# Patient Record
Sex: FEMALE | Race: OTHER | Employment: UNEMPLOYED | URBAN - METROPOLITAN AREA
[De-identification: names, ages, dates, MRNs, and addresses within clinical notes are randomized per-mention and may not be internally consistent; named-entity substitution may affect disease eponyms.]

---

## 2017-05-09 ENCOUNTER — OFFICE VISIT (OUTPATIENT)
Dept: FAMILY MEDICINE CLINIC | Age: 32
End: 2017-05-09

## 2017-05-09 ENCOUNTER — HOSPITAL ENCOUNTER (OUTPATIENT)
Dept: LAB | Age: 32
Discharge: HOME | End: 2017-05-09

## 2017-05-09 VITALS
HEART RATE: 67 BPM | TEMPERATURE: 97.7 F | SYSTOLIC BLOOD PRESSURE: 121 MMHG | DIASTOLIC BLOOD PRESSURE: 87 MMHG | HEIGHT: 65 IN | BODY MASS INDEX: 30.82 KG/M2 | WEIGHT: 185 LBS

## 2017-05-09 DIAGNOSIS — N92.1 MENORRHAGIA WITH IRREGULAR CYCLE: Primary | ICD-10-CM

## 2017-05-09 DIAGNOSIS — N92.1 MENORRHAGIA WITH IRREGULAR CYCLE: ICD-10-CM

## 2017-05-09 DIAGNOSIS — Z13.0 SCREENING FOR IRON DEFICIENCY ANEMIA: ICD-10-CM

## 2017-05-09 LAB
HCG SERPL QL: NEGATIVE
HGB BLD-MCNC: 11.8 G/DL

## 2017-05-09 PROCEDURE — 84703 CHORIONIC GONADOTROPIN ASSAY: CPT | Performed by: NURSE PRACTITIONER

## 2017-05-09 NOTE — PROGRESS NOTES
Assessment/Plan:       ICD-10-CM ICD-9-CM    1. Menorrhagia with irregular cycle N92.1 626.2 HCG QL SERUM   2. Screening for iron deficiency anemia Z13.0 V78.0 AMB POC HEMOGLOBIN (HGB)     Follow-up Disposition: Not on File    [facility]  Subjective:   Hamida Villarreal is a 34 y.o. OTHER female who speaks Ghanaian. Chief Complaint   Patient presents with    Vaginal Pain     with blood clots during intercourse and with period was told she has fibriod in the uterus x 1 month    History of Present Illness: Fibroid found one year ago. Had an ultrasound in [facility]. Her periods are very strong for 2 days with pain. She had IUD in [facility], had copper T and it was removed due to lower left abdominal pelvic pain. She had it for 8 years. Her periods have always been like this. She is having more pains with her periods. Started taking the BCP last month for 2 days and it made her sick. Has been having the period with a lot of clots and has been bleeding ever since. Her period then started again heavy today. Review of Systems: Negative for: fever, chest pain, shortness of breath, leg swelling, exertional dyspnea, palpitations. Past Surgical History: She  has no past surgical history on file. Social History: She  reports that she has never smoked. She does not have any smokeless tobacco history on file. She reports that she does not drink alcohol. Objective:     Vitals:    05/09/17 0837   BP: 121/87   Pulse: 67   Temp: 97.7 °F (36.5 °C)   TempSrc: Oral   Weight: 185 lb (83.9 kg)   Height: 5' 4.69\" (1.643 m)    Patient's last menstrual period was 05/09/2017. Wt Readings from Last 2 Encounters:   05/09/17 185 lb (83.9 kg)     Lab Review:  Results for orders placed or performed in visit on 05/09/17   AMB POC HEMOGLOBIN (HGB)   Result Value Ref Range    Hemoglobin (POC) 11.8       Physical Examination:   General appearance - well developed, no acute distress.    Chest - clear to auscultation. Heart - regular rate and rhythm without murmurs, rubs, or gallops. Abdomen - bowel sounds present x 4, NT, ND. Extremities - pulses intact. No peripheral edema. Assessment/Plan:   Hiro Burns was seen today for vaginal pain. Diagnoses and all orders for this visit:    Menorrhagia with irregular cycle  -     HCG QL SERUM; Future    Screening for iron deficiency anemia  -     AMB POC HEMOGLOBIN (HGB)    -pregnancy test  -April 19 last home test for neg pregnancy  Last pap was normal, more than 1 year ago. To follow up after bleeding has stopped for pelvic exam, eval Of pelvic mass/fibroid. If pregnancy test negative, will send in bcp. This plan was discussed with the patient. Follow-up Disposition: Not on File  Tati Woods, MSN, RN, FNP-BC, BC-ADM  Karis Alicea expressed understanding of this plan.

## 2017-05-09 NOTE — MR AVS SNAPSHOT
Visit Information Arianne Troncoso Personal Médico Departamento Teléfono del Dep. Número de visita 5/9/2017  8:30 AM Lencho PatiñoJANIS porras CVAN- [address] [phone] Upcoming Health Maintenance Date Due DTaP/Tdap/Td series (1 - Tdap) 3/23/2005 PAP AKA CERVICAL CYTOLOGY 3/23/2005 INFLUENZA AGE 9 TO ADULT 8/1/2017 Maia Ledesma A partir del:  5/9/2017 No Known Allergies Vacunas actuales [place] No hay ninguna vacuna archivada. No revisadas esta visita You Were Diagnosed With   
  
 Clayborne Ni Menorrhagia with irregular cycle    -  Primary ICD-10-CM: N92.1 ICD-9-CM: 626.2 Screening for iron deficiency anemia     ICD-10-CM: Z13.0 ICD-9-CM: V78.0 Partes vitales PS Pulso Temperatura [place] ( percentil de crecimiento) Peso (percentil de crecimiento) LMP (última edmundo) 121/87 (BP 1 Location: Left arm, BP Patient Position: Sitting) 67 97.7 °F (36.5 °C) (Oral) 5' 4.69\" (1.643 m) 185 lb (83.9 kg) 05/09/2017 BMI [facility]) Estado obstétrico Estatus de tabaquísmo 31.09 kg/m2 Having regular periods Never Smoker Historial de signos vitales BMI and BSA Data Body Mass Index Body Surface Area 31.09 kg/m 2 1.96 m 2 [facility] Name [facility] [address] [place] [address] [phone] Branch lista de medicamentos actualizada Aviso  As of 5/9/2017  9:46 AM  
 No se le ha recetado ningún medicamento. Hicimos lo siguiente AMB POC HEMOGLOBIN (HGB) [64610 CPT(R)] Introducing [facility] & HEALTH SERVICES! [facility] introduce portal paciente MyChart . Ahora se puede acceder a partes de branch expediente médico, enviar por correo electrónico la oficina de branch médico y solicitar renovaciones de medicamentos en línea. En branch navegador de Internet , Nikia Wagner a [email or web address] Narciso clic en el usuario por Ann Quill? Madi Maryana clic aquí en la sesión Berna Mass. Verá la página de registro [place]. Ingrese bacon código de Bank [employer] Justyna shay y alem aparece a continuación. Usted no tendrá que [employer] código después de wang completado el proceso de registro . Si usted no se inscribe antes de la fecha de caducidad , debe solicitar un nuevo código. · MyChart Código de acceso : [id number] Expires: 8/7/2017  9:44 AM 
 
Ingresa los últimos cuatro dígitos de bacon Número de Seguro Social ( xxxx ) y fecha de nacimiento ( dd / mm / aaaa ) alem se indica y narciso clic en Enviar. Usted será llevado a la siguiente página de registro . Crear un ID MyChart . Esta será bacon ID de inicio de sesión de MyChart y no puede ser [place] , por lo que pensar en andrea que es Ludmila Blanks y fácil de recordar . Crear andrea contraseña MyChart . Usted puede cambiar bacon contraseña en cualquier momento . Ingrese bacon Password Reset de preguntas y [place] . Ben [place] se puede utilizar en un momento posterior si usted olvida bacon contraseña. Introduzca bacon dirección de correo electrónico . Kirstin Garvin recibirá andrea notificación por correo electrónico cuando la nueva información está disponible en MyChart . Idella Kervin clic en Registrarse. Eric Laurie cande y descargar porciones de bacon expediente médico. 
Narciso clic en el enlace de descarga del menú Resumen para descargar andrea copia portátil de bacon información médica . Si tiene [employer] , por favor visite la sección de preguntas frecuentes del sitio web MyChart . Recuerde, MyChart NO es que se utilizará para las necesidades urgentes. Para emergencias médicas , llame al 911 . Ahora disponible en bacon iPhone y Android ! Por favor proporcione luis eduardo resumen de la documentación de cuidado a bacon próximo proveedor. If you have any questions after today's visit, please call [phone].

## 2017-05-09 NOTE — PROGRESS NOTES
Statements below were documented by Judy Fabian RN My  for this patient visit was Margaret. Patient discharged home with AVS. No further questions. Pharmacy of choice entered in computer per provider request . Sent to registrar to schedule pelvic exam at [facility] . Informed patient that if HCG serum is positive will receive a telephone call . Acknowledged understanding of information discussed at discharge.  Judy Fabian RN

## 2017-05-10 DIAGNOSIS — N92.1 MENORRHAGIA WITH IRREGULAR CYCLE: Primary | ICD-10-CM

## 2017-05-10 RX ORDER — NORGESTIMATE AND ETHINYL ESTRADIOL 0.25-0.035
1 KIT ORAL DAILY
Qty: 1 PACKAGE | Refills: 3 | Status: SHIPPED | OUTPATIENT
Start: 2017-05-10 | End: 2018-09-07

## 2017-05-10 NOTE — PROGRESS NOTES
No change to plan. Please let her know I sent in the birth control pills to regulate her period, she is not pregnant. She may start taking them right away.

## 2017-05-17 ENCOUNTER — TELEPHONE (OUTPATIENT)
Dept: FAMILY MEDICINE CLINIC | Age: 32
End: 2017-05-17

## 2017-05-17 NOTE — TELEPHONE ENCOUNTER
The pt was called using int. #[id number]. She was given the message her Hcg was negative. She was continue on th esame plan as discussed with her provider. The BCP rx was sent in to the Pharmacy. these were to regulate her periods. The Pharmacy was confirmed. She was told to start the pills right away. The pt verbalized understanding and denied further questions.  Bishop Darleen RN

## 2018-04-20 ENCOUNTER — OFFICE VISIT (OUTPATIENT)
Dept: FAMILY MEDICINE CLINIC | Age: 33
End: 2018-04-20

## 2018-04-20 VITALS
TEMPERATURE: 98.3 F | HEART RATE: 70 BPM | HEIGHT: 65 IN | WEIGHT: 197 LBS | BODY MASS INDEX: 32.82 KG/M2 | OXYGEN SATURATION: 100 % | SYSTOLIC BLOOD PRESSURE: 117 MMHG | DIASTOLIC BLOOD PRESSURE: 79 MMHG

## 2018-04-20 DIAGNOSIS — Z71.89 COUNSELING AND COORDINATION OF CARE: Primary | ICD-10-CM

## 2018-04-20 DIAGNOSIS — Z13.9 ENCOUNTER FOR SCREENING: Primary | ICD-10-CM

## 2018-04-20 DIAGNOSIS — J45.40 MODERATE PERSISTENT ASTHMA WITHOUT COMPLICATION: ICD-10-CM

## 2018-04-20 LAB
BILIRUB UR QL STRIP: NEGATIVE
GLUCOSE UR-MCNC: NEGATIVE MG/DL
KETONES P FAST UR STRIP-MCNC: NEGATIVE MG/DL
PH UR STRIP: 5 [PH] (ref 4.6–8)
PROT UR QL STRIP: NEGATIVE
SP GR UR STRIP: 1 (ref 1–1.03)
UA UROBILINOGEN AMB POC: NORMAL (ref 0.2–1)
URINALYSIS CLARITY POC: CLEAR
URINALYSIS COLOR POC: YELLOW
URINE BLOOD POC: NEGATIVE
URINE LEUKOCYTES POC: NORMAL
URINE NITRITES POC: NEGATIVE

## 2018-04-20 RX ORDER — MONTELUKAST SODIUM 10 MG/1
10 TABLET ORAL DAILY
Qty: 90 TAB | Refills: 1 | Status: SHIPPED | OUTPATIENT
Start: 2018-04-20

## 2018-04-20 RX ORDER — MONTELUKAST SODIUM 10 MG/1
10 TABLET ORAL DAILY
Qty: 90 TAB | Refills: 1 | Status: SHIPPED | OUTPATIENT
Start: 2018-04-20 | End: 2018-04-20 | Stop reason: SDUPTHER

## 2018-04-20 RX ORDER — LORATADINE 10 MG/1
10 TABLET ORAL DAILY
Qty: 90 TAB | Refills: 3 | Status: SHIPPED | OUTPATIENT
Start: 2018-04-20

## 2018-04-20 RX ORDER — ALBUTEROL SULFATE 90 UG/1
2 AEROSOL, METERED RESPIRATORY (INHALATION)
Qty: 1 INHALER | Refills: 11
Start: 2018-04-20

## 2018-04-20 NOTE — PROGRESS NOTES
Printed AVS, provided to pt and reviewed. Pt indicated understanding and had no questions. Told pt that rx's have been sent to pharmacy and they should be ready for  in approximately 2 hrs. Please present GoodRx. com coupon which we provide to your pharmacy to receive discounted price. The medication's were reviewed with the pt. Crossover application was given to the pt. The pt was told about resources needed for family planning and was going to give the pt the names and addresses but the pt informed the  Usman Koenig she is trying to get pregnant. Usman Koenig stated she  is going to have the pt call and get an appt at [address] [place] [address]. Pt [place] near there.  Yessica Nagel RN

## 2018-04-20 NOTE — PROGRESS NOTES
Results for orders placed or performed in visit on 04/20/18   AMB POC URINALYSIS DIP STICK MANUAL W/O MICRO   Result Value Ref Range    Color (UA POC) Yellow     Clarity (UA POC) Clear     Glucose (UA POC) Negative Negative    Bilirubin (UA POC) Negative Negative    Ketones (UA POC) Negative Negative    Specific gravity (UA POC) 1.005 1.001 - 1.035    Blood (UA POC) Negative Negative    pH (UA POC) 5.0 4.6 - 8.0    Protein (UA POC) Negative Negative    Urobilinogen (UA POC) 0.2 mg/dL 0.2 - 1    Nitrites (UA POC) Negative Negative    Leukocyte esterase (UA POC) 1+ Negative

## 2018-04-20 NOTE — MR AVS SNAPSHOT
Julio C Sutton 
 
 
 [facility] [address] [facility] 57 
[phone] Patient: Ronni Lawson MRN: [id number] FWA:[id number] Visit Information Clint Fernández Personal Médico Departamento Teléfono del [facility]. Número de visita 4/20/2018  9:00 AM [facility] [phone] Follow-up Instructions Return in about 4 weeks (around 5/18/2018). Your Appointments 5/21/2018  2:00 PM  
Follow Up with Yelena Kurtz NP  
[facility] Appt Note: Follow up (4 weeks) per MM by [facility] [address] [facility] 7 [phone]  
[phone]  
  
   
 [facility] [address] [place] [phone] Upcoming Health Maintenance Date Due DTaP/Tdap/Td series (1 - Tdap) 3/23/2005 PAP AKA CERVICAL CYTOLOGY 3/23/2005 Influenza Age 5 to Adult 8/1/2017 Alergias  Review Complete El: 5/9/2017 Por: JANIS Vieyra del:  4/20/2018 No Known Allergies Vacunas actuales [place] No hay ninguna vacuna archivada. No revisadas esta visita You Were Diagnosed With   
  
 Jase Clipper Encounter for screening    -  Primary ICD-10-CM: Z13.9 ICD-9-CM: V82.9 Moderate persistent asthma without complication     E-43-KJ: J45.40 ICD-9-CM: 493.90 Partes vitales PS Pulso Temperatura [place] ( percentil de crecimiento) Peso (percentil de crecimiento) LMP (última edmundo)  
 117/79 (BP 1 Location: Right arm) 70 98.3 °F (36.8 °C) (Oral) 5' 4.57\" (1.64 m) 197 lb (89.4 kg) 03/22/2018 SpO2 BMI (IM) Estado obstétrico Estatus de tabaquísmo 100% 33.22 kg/m2 Having regular periods Never Smoker Historial de signos vitales BMI and BSA Data Body Mass Index Body Surface Area  
 33.22 kg/m 2 2.02 m 2 [facility] Name Phone [facility] [address] [place] [address] [phone] Branch lista de medicamentos actualizada Ioana Sy actualizada 4/20/18 10:17 AM.  Sydell Simmonds use branch lista de medicamentos más reciente. albuterol 90 mcg/actuation inhaler También conocido alem:  PROVENTIL HFA, VENTOLIN HFA, PROAIR HFA Take 2 Puffs by inhalation every four (4) hours as needed for Wheezing. To send to [facility] once she is qualified  
  
 fluticasone-salmeterol 115-21 mcg/actuation inhaler También conocido alem:  ADVAIR HFA Take 2 Puffs by inhalation two (2) times a day. To send to [facility] once she is qualified  
  
 loratadine 10 mg tablet También conocido alem:  Wen Dumont Take 1 Tab by mouth daily. Maybell 1 pastilla cada sahra  
  
 montelukast 10 mg tablet También conocido alem:  SINGULAIR Take 1 Tab by mouth daily. May[place] 1 pastilla cada sahra  
  
 norgestimate-ethinyl estradiol 0.25-35 mg-mcg Tab También conocido alem:  Mark Norma Take 1 Tab by mouth daily. Eneida 1 cada sahra. Recetas Enviado a la Chata Refills  
 montelukast (SINGULAIR) 10 mg tablet 1 Sig: Take 1 Tab by mouth daily. Maybell 1 pastilla cada sahra Class: Normal  
 Pharmacy: [facility] DR AMY LARSEN [address] [place] [address] Ph #: [phone] Route: Oral  
 loratadine (CLARITIN) 10 mg tablet 3 Sig: Take 1 Tab by mouth daily. Maybell 1 pastilla cada sahra Class: Normal  
 Pharmacy: [facility] DR AMY LARSEN [address] [place] [address] Ph #: [phone] Route: Oral  
  
Hicimos lo siguiente AMB POC URINALYSIS DIP STICK MANUAL W/O MICRO [57039 CPT(R)] Instrucciones de seguimiento Return in about 4 weeks (around 5/18/2018). Instrucciones para el Paciente Plan de acción para el asma: Después de la consulta [facility] Chol Action Plan: After Your Visit] Instrucciones de cuidado Un plan de acción para el asma se basa en el flujo justus y en los síntomas del asma. Clasificar los síntomas y el flujo justus en \"zonas\" Claryce Rasp y verdes puede ayudar a saber qué fox grave es el asma y qué medidas debe deni. Colabore con bacon médico para elaborar bacon plan. Un plan de acción puede incluir: 
· Las lecturas de flujo justus y síntomas para cada jodie. · Qué medicamentos debe deni en cada jodie. · Cuándo llamar al [place]. 
· Cristina Celina lista de números telefónicos de emergencia. · Andrea lista de hannah factores desencadenantes del asma. La atención de seguimiento es andrea parte clave de bacon tratamiento y seguridad. Asegúrese de hacer y acudir a todas las citas, y llame a bacon médico si está teniendo problemas. También es andrea buena idea saber los resultados de los exámenes y mantener andrea lista de los medicamentos que ana. Cómo puede cuidarse en el hogar? · [place] hannah medicamentos diarios para minimizar los daños a Rosea Haus y evitar ataques de asma. · Verifique bacon flujo justus cada mañana y noche. Esta es la mejor manera de saber cómo están funcionando los pulmones. · Revise bacon plan de acción para cande en qué jodie está. ¨ Si está en la jodie leanna, siga tomando hannah medicamentos diarios para el asma según las indicaciones. ¨ Si está en la jodie amarilla, puede estar teniendo o tendrá en breve un ataque de asma. Es posible que no tenga ningún síntoma, kuldip hannah pulmones no están funcionando fox cricket alem deberían. [place] los medicamentos que figuran en bacon plan de acción. Si se mantiene en la jodie ANDREA, el médico podría necesitar aumentar la dosis o agregar otro medicamento. ¨ Si está en la jodie kyle, siga bacon plan de acción. Si hannah síntomas o bacon flujo justus no mejoran pronto, es posible que tenga que ir a la ronald de emergencias o que lo tengan que internar en el hospital. 
· Lleve un diario del asma. Anote hannah lecturas de flujo justus en el diario del asma. Si tiene un ataque, anote cuál fue la causa (si la sabe), los síntomas y qué medicamento tomó. · Asegúrese de saber cómo y cuándo llamar al médico o ir al hospital. 
· Lleve el plan de acción para el asma y el diario del asma, junto con bacon medidor de flujo justus y keren medicamentos cuando navarro a bacon médico. Dígale a bacon médico si está teniendo problemas para seguir bacon plan de acción. Cuándo debe pedir ayuda? Llame al 911 en cualquier momento que considere que necesita atención de emergencia. Por ejemplo, llame si: · Tiene graves dificultades para respirar. Llame a bacon médico ahora mismo o busque atención médica inmediata si: 
· Keren síntomas no mejoran después de seguir el plan de acción para el asma. · Tose con mucosidad (esputo) amarilla, color café oscuro o con shilpi. Preste especial atención a los cambios en bacon fabian y asegúrese de comunicarse con bacon médico si: 
· La tos y las sibilancias (respiración con silbidos) Marvetta Laity. · Necesita usar el medicamento de alivio rápido [place] de 2 días a la semana (a menos que sea solo para hacer ejercicio). · Necesita ayuda para averiguar qué desencadena los ataques de asma. Dónde puede encontrar más información en inglés? Meeta Taylor a DealExplorer.be Escriba B511 en la búsqueda para aprender más acerca de \"Plan de acción para el asma: Después de la consulta. \"  
© 8802-1080 Healthwise, Incorporated. Instrucciones de cuidado adaptadas bajo licencia por Deneice Chiles (which disclaims liability or warranty for this information). Estas instrucciones de cuidado son para usarlas con bacon profesional clínico registrado. Si tiene preguntas acerca de andrea afección médica o de estas instrucciones, pregunte siempre a bacon profesional de [employer]. Healthwise, Incorporated niega cualquier garantía o responsabilidad por bacon uso de esta información. Versión del contenido: [id number] Stains revisión: 9 marzo, 2012 Introducing \[facility]\"" & [facility] SERVICES! [facility] introduce portal paciente MyChart .  Ahora se puede acceder a partes de bacon expediente médico, enviar por correo electrónico la oficina de bacon médico y solicitar renovaciones de medicamentos en línea. En bacon navegador de Internet , James Cardenas a [email or web address] Narciso clic en el usuario por Restrepo Josie? Joonmayra Carter clic aquí en la sesión [facility] Rail. Verá la página de registro [place]. Ingrese bacon código de [employer] of Justyna shay y alem aparece a continuación. Usted no tendrá que [employer] código después de wang completado el proceso de registro . Si usted no se inscribe antes de la fecha de caducidad , debe solicitar un nuevo código. · MyChart Código de acceso : [id number] Expires: 7/19/2018 10:17 AM 
 
Ingresa los últimos cuatro dígitos de bacno Número de Seguro Social ( xxxx ) y fecha de nacimiento ( dd / mm / aaaa ) alem se indica y narciso clic en Enviar. Usted será llevado a la siguiente página de registro . Crear un ID MyChart . Esta será bacon ID de inicio de sesión de MyChart y no puede ser [place] , por lo que pensar en andrea que es Angely Hamming y fácil de recordar . Crear andrea contraseña MyChart . Usted puede cambiar bacon contraseña en cualquier momento . Ingrese bacon Password Reset de preguntas y [place] . [place] se puede utilizar en un momento posterior si usted olvida bacon contraseña. Introduzca bacon dirección de correo electrónico . Zack Taylor recibirá andrea notificación por correo electrónico cuando la nueva información está disponible en MyChart . Jihan Hernández clic en Registrarse. Melissa Pu cande y descargar porciones de bacon expediente médico. 
Narciso clic en el enlace de descarga del menú Resumen para descargar andrea copia portátil de bacon información médica . Si tiene [employer] , por favor visite la sección de preguntas frecuentes del sitio web MyChart . Recuerde, MyChart NO es que se utilizará para las necesidades urgentes. Para emergencias médicas , llame al 911 . Ahora disponible en bacon iPhone y Android ! Por favor proporcione luis eduardo resumen de la documentación de cuidado a bacon próximo proveedor. If you have any questions after today's visit, please call [phone].

## 2018-04-20 NOTE — PATIENT INSTRUCTIONS
Plan de acción para el asma: Después de la consulta  [Asthma Action Plan: After Your Visit]  Instrucciones de cuidado  Un plan de acción para el asma se basa en el flujo justus y en los síntomas del asma. Clasificar los síntomas y el flujo justus en \"zonas\" Royetta Dubin y verdes puede ayudar a saber qué fox grave es el asma y qué medidas debe deni. Colabore con bacon médico para elaborar bacon plan. Un plan de acción puede incluir:  · Las lecturas de flujo justus y síntomas para cada jodie. · Qué medicamentos debe deni en cada jodie. · Cuándo llamar al Rebbecca Jesse.  · Pamila Locker lista de números telefónicos de emergencia. · Andrea lista de hannah factores desencadenantes del asma. La atención de seguimiento es andrea parte clave de bacon tratamiento y seguridad. Asegúrese de hacer y acudir a todas las citas, y llame a bacon médico si está teniendo problemas. También es andrea buena idea saber los resultados de los exámenes y mantener andrea lista de los medicamentos que ana. ¿Cómo puede cuidarse en el hogar? · [place] hannah medicamentos diarios para minimizar los daños a Jinnie Nazia y evitar ataques de asma. · Verifique bacon flujo justus cada mañana y noche. Esta es la mejor manera de saber cómo están funcionando los pulmones. · Revise bacon plan de acción para cande en qué jodie está. ¨ Si está en la jodie leanna, siga tomando hannah medicamentos diarios para el asma según las indicaciones. ¨ Si está en la jodie amarilla, puede estar teniendo o tendrá en breve un ataque de asma. Es posible que no tenga ningún síntoma, kuldip hannah pulmones no están funcionando fox cricket alem deberían. [place] los medicamentos que figuran en bacon plan de acción. Si se mantiene en la jodie ANDREA, el médico podría necesitar aumentar la dosis o agregar otro medicamento. ¨ Si está en la jodie kyle, siga bacon plan de acción.  Si hannah síntomas o bacon flujo justus no mejoran pronto, es posible que tenga que ir a la ronald de emergencias o que lo tengan que internar en el hospital.  · [employer] un diario del asma. Anote keren lecturas de flujo justus en el diario del asma. Si tiene un ataque, anote cuál fue la causa (si la sabe), los síntomas y qué medicamento tomó. · Asegúrese de saber cómo y cuándo llamar al médico o ir al hospital.  · Lleve el plan de acción para el asma y el diario del asma, junto con bacon medidor de flujo justus y keren medicamentos cuando navarro a bacon médico. Dígale a bacon médico si está teniendo problemas para seguir bacon plan de acción. ¿Cuándo debe pedir ayuda? Llame al 911 en cualquier momento que considere que necesita atención de emergencia. Por ejemplo, llame si:  · Tiene graves dificultades para respirar. Llame a bacon médico ahora mismo o busque atención médica inmediata si:  · Keren síntomas no mejoran después de seguir el plan de acción para el asma. · Tose con mucosidad (esputo) amarilla, color café oscuro o con shilpi. Preste especial atención a los cambios en bacon fabian y asegúrese de comunicarse con bacon médico si:  · La tos y las sibilancias (respiración con silbidos) Jodene Meng. · Necesita usar el medicamento de alivio rápido [id number] Krys Heredia de 2 días a la semana (a menos que sea solo para hacer ejercicio). · Necesita ayuda para averiguar qué desencadena los ataques de asma. ¿Dónde puede encontrar más información en inglés? Carlito Late a DealExplorer.be  Frank Hatter B511 en la búsqueda para aprender más acerca de \"Plan de acción para el asma: Después de la consulta. \"   © 9545-8588 Healthwise, Incorporated. Instrucciones de cuidado adaptadas bajo licencia por Camilo Fajardo (which disclaims liability or warranty for this information). Estas instrucciones de cuidado son para usarlas con bacon profesional clínico registrado. Si tiene preguntas acerca de andrea afección médica o de estas instrucciones, pregunte siempre a bacon profesional de [employer]. Healthwise, Incorporated niega cualquier garantía o responsabilidad por bacon uso de esta información.   Versión del contenido: [id number]; Akua Lyubov revisión: [address] [place] [address], [facility]

## 2018-04-20 NOTE — PROGRESS NOTES
Assessment/Plan:    Diagnoses and all orders for this visit:    1. Encounter for screening  -     AMB POC URINALYSIS DIP STICK MANUAL W/O MICRO    2. Moderate persistent asthma without complication  -     montelukast (SINGULAIR) 10 mg tablet; Take 1 Tab by mouth daily. [place] 1 pastilla cada sahra  -     loratadine (CLARITIN) 10 mg tablet; Take 1 Tab by mouth daily. [place] 1 pastilla cada sahra  -     fluticasone-salmeterol (ADVAIR HFA) 115-21 mcg/actuation inhaler; Take 2 Puffs by inhalation two (2) times a day. [facility] once she is qualified  -     albuterol (PROVENTIL HFA, VENTOLIN HFA, PROAIR HFA) 90 mcg/actuation inhaler; Take 2 Puffs by inhalation every four (4) hours as needed for Wheezing. To [facility] to [facility] once she is qualified    Using prednisone 5 mg PRN to help with her asthma sxs, also on albuterol inhaler- meds from [address] [place] [address]. are present all year long  Has had them since childhood    Follow-up Disposition:  Return in about 4 weeks (around 5/18/2018). ANNEMARIE Lange expressed understanding of this plan. An AVS was printed and given to the patient.      ----------------------------------------------------------------------    Chief Complaint   Patient presents with    Follow-up     asthma    Urinary Frequency     x1 year. Patient states she is in pain in abdomen but not while urinating. History of Present Illness:  Pt here for asthma treatment. She has had asthma since childhood but since her move to the [address] [place] [address] in 2/17, she has been suffering the whole time. Her sxs are wheezing and shortness of breath. She gets nervous due to the sxs. She is fine in her house (?) but when she leaves the house, her sxs flare up. She takes prednisone 5 mg tab for \"crisis\", she took one pill 2 days ago and her breathing improved. Her only other med is her albuterol inhaler. She has one 10 year child. She is staying at home and not working due to her asthma.  She would like to be working. She is in a relationship. She mentioned that she is here on political asylum due to the horrible things that happened in [facility] but she did not elaborate further. She is a non smoker. She thinks that her nervousness is all due to the asthma, not due to the trauma    She denies dysuria. She has painful, heavy periods. I am asking for her to be sent to [facility]/local resource for this so that she may have family planning if she decides to do this       No past medical history on file. Current Outpatient Prescriptions   Medication Sig Dispense Refill    montelukast (SINGULAIR) 10 mg tablet Take 1 Tab by mouth daily. [place] 1 pastilla cada sahra 90 Tab 1    loratadine (CLARITIN) 10 mg tablet Take 1 Tab by mouth daily. [place] 1 pastilla cada sahra 90 Tab 3    fluticasone-salmeterol (ADVAIR HFA) 115-21 mcg/actuation inhaler Take 2 Puffs by inhalation two (2) times a day. To [facility] once she is qualified 1 Inhaler 11    albuterol (PROVENTIL HFA, VENTOLIN HFA, PROAIR HFA) 90 mcg/actuation inhaler Take 2 Puffs by inhalation every four (4) hours as needed for Wheezing. To [facility] to [facility] once she is qualified 1 Inhaler 11    norgestimate-ethinyl estradiol (ORTHO-CYCLEN, [facility]) 0.25-35 mg-mcg tab Take 1 Tab by mouth daily. [place] 1 cada sahra. 1 Package 3       No Known Allergies    Social History   Substance Use Topics    Smoking status: Never Smoker    Smokeless tobacco: Never Used    Alcohol use No       No family history on file. Physical Exam:     Visit Vitals    /79 (BP 1 Location: Right arm)    Pulse 70    Temp 98.3 °F (36.8 °C) (Oral)    Ht 5' 4.57\" (1.64 m)    Wt 197 lb (89.4 kg)    LMP 03/22/2018    SpO2 100%    BMI 33.22 kg/m2       A&Ox3  WDWN NAD  Respirations normal and non labored  Crying at times  She has gained 40 lbs since arriving in the [place].  Not active, staying in home  Lungs are cTA keith today

## 2018-04-24 NOTE — PROGRESS NOTES
Met with patient for [facility] referral. Support letter pending for financial screening.  Carlos Perez

## 2018-09-04 ENCOUNTER — OFFICE VISIT (OUTPATIENT)
Dept: OBGYN CLINIC | Age: 33
End: 2018-09-04

## 2018-09-04 DIAGNOSIS — Z34.81 NORMAL PREGNANCY IN MULTIGRAVIDA IN FIRST TRIMESTER: Primary | ICD-10-CM

## 2018-09-04 PROBLEM — Z34.90 PREGNANCY: Status: ACTIVE | Noted: 2018-09-04

## 2018-09-04 NOTE — PROGRESS NOTES
Current pregnancy history:    Siobhan Sheffield is a 28 y.o. female who presents for the evaluation of pregnancy. Patient's last menstrual period was in May. LMP history:  The date of her LMP is not certain. It was some time in May. This would make her 13 to 18 weeks. Ultrasound data:  She has an ultrasound scheduled for tomorrow. Pregnancy symptoms:    Since her LMP she has experienced  urinary frequency, breast tenderness, and nausea. She has been vomiting over the last few weeks. Associated signs and symptoms which she denies: dysuria, discharge, vaginal bleeding. She states she has lost weight:  Approximately 5 pounds over the last few weeks. Relevant past pregnancy history:   She has the following pregnancy history: Her last pregnancy was complicated by elevated blood pressure   She has no history of  delivery. Relevant past medical history:(relevant to this pregnancy): noncontributory. Pap/Occupational history:  Last pap smear: last year Results: Normal      Her occupation is: none. Substance history: negative for alcohol, tobacco and street drugs. Positive for nothing. Exposure history: There is/are no indoor cat/s in the home. She admits close contact with children on a regular basis. She has not had chicken pox or the vaccine in the past.   Patient denies issues with domestic violence. Genetic Screening/Teratology Counseling: (Includes patient, baby's father, or anyone in either family with:)  3.  Patient's age >/= 28 at [facility] 39?-- no  .   2. Thalassemia ([facility], [facility], [address], or  background): MCV<80?--no.     3.  Neural tube defect (meningomyelocele, spina bifida, anencephaly)?--no.   4.  Congenital heart defect?--no.  5.  Down syndrome?--no.   6.  Gómez-Sachs ([place], [place])?--no.   7.  Canavan's Disease?--no.   8.  Familial Dysautonomia?--no.   9.  Sickle cell disease or trait ()? --no   The patient has not been tested for sickle trait  10. Hemophilia or other blood disorders?--no. 11.  Muscular dystrophy?--no. 12.  Cystic fibrosis?--no. 13.  Ellis's Chorea?--no. 14.  Mental retardation/autism (if yes was person tested for Fragile X)?--no. 15.  Other inherited genetic or chromosomal disorder?--no. 12.  Maternal metabolic disorder (DM, PKU, etc)?--no. 17.  Patient or FOB with a child with a birth defect not listed above?--no.  17a. Patient or FOB with a birth defect themselves?--no. 18.  Recurrent pregnancy loss, or stillbirth?--no. 19.  Any medications since LMP other than prenatal vitamins (include vitamins,  supplements, OTC meds, drugs, alcohol)?--no. 20.  Any other genetic/environmental exposure to discuss?--no. Infection History:  1. Lives with someone with TB or TB exposed?--no.   2.  Patient or partner has history of genital herpes?--no.  3.  Rash or viral illness since LMP?--no.    4.  History of STD (GC, CT, HPV, syphilis, HIV)? --no   5. Other: OTHER? Past Medical History:   Diagnosis Date    Asthma     Hypertension      History reviewed. No pertinent surgical history. Social History     Occupational History    Not on file. Social History Main Topics    Smoking status: Never Smoker    Smokeless tobacco: Never Used    Alcohol use No    Drug use: No    Sexual activity: Yes     Family History   Problem Relation Age of Onset    No Known Problems Mother     No Known Problems Father        No Known Allergies  Prior to Admission medications    Medication Sig Start Date End Date Taking? Authorizing Provider   loratadine (CLARITIN) 10 mg tablet Take 1 Tab by mouth daily. [place] 1 dylan barr sahra 4/20/18   BARB Horner   fluticasone-salmeterol (ADVAIR HFA) 115-21 mcg/actuation inhaler Take 2 Puffs by inhalation two (2) times a day.  To [facility] once she is qualified 4/20/18   BARB Mercedes   albuterol (PROVENTIL HFA, VENTOLIN HFA, PROAIR HFA) 90 mcg/actuation inhaler Take 2 Puffs by inhalation every four (4) hours as needed for Wheezing. To send to [facility] once she is qualified 4/20/18   BARB Sarmiento   montelukast (SINGULAIR) 10 mg tablet Take 1 Tab by mouth daily. [place] 1 pastilla cada sahra 4/20/18   BARB Horner   norgestimate-ethinyl estradiol (ORTHO-CYCLEN, [facility]) 0.25-35 mg-mcg tab Take 1 Tab by mouth daily. [place] 1 cada sahra.  5/10/17   Kriss Couch NP        Review of Systems: History obtained from the patient  Constitutional: negative for weight loss, fever, night sweats  HEENT: negative for hearing loss, earache, congestion, snoring, sorethroat  CV: negative for chest pain, palpitations, edema  Resp: negative for cough, shortness of breath, wheezing  Breast: negative for breast lumps, nipple discharge, galactorrhea  GI: negative for change in bowel habits, abdominal pain, black or bloody stools  : negative for frequency, dysuria, hematuria, vaginal discharge  MSK: negative for back pain, joint pain, muscle pain  Skin: negative for itching, rash, hives  Neuro: negative for dizziness, headache, confusion, weakness  Psych: negative for anxiety, depression, change in mood  Heme/lymph: negative for bleeding, bruising, pallor    Objective:  Visit Vitals    LMP 03/22/2018       Physical Exam:   PHYSICAL EXAMINATION    Constitutional  · Appearance: well-nourished, well developed, alert, in no acute distress    HENT  · Head  · Face: appears normal  · Eyes: appear normal  · Ears: normal  · Mouth: normal  · Lips: no lesions    Neck  · Inspection/Palpation: normal appearance, no masses or tenderness  · Lymph Nodes: no lymphadenopathy present  · Thyroid: gland size normal, nontender, no nodules or masses present on palpation    Chest  · Respiratory Effort: breathing unlabored  · Auscultation: normal breath sounds    Cardiovascular  · Heart:  · Auscultation: regular rate and rhythm without murmur    Breasts  · Inspection of Breasts: breasts symmetrical, no skin changes, no discharge present, nipple appearance normal, no skin retraction present  · Palpation of Breasts and Axillae: no masses present on palpation, no breast tenderness  · Axillary Lymph Nodes: no lymphadenopathy present    Gastrointestinal  · Abdominal Examination: abdomen non-tender to palpation, normal bowel sounds, no masses present  · Liver and spleen: no hepatomegaly present, spleen not palpable  · Hernias: no hernias identified    Genitourinary  · External Genitalia: normal appearance for age, no discharge present, no tenderness present, no inflammatory lesions present, no masses present, no atrophy present  · Vagina: normal vaginal vault without central or paravaginal defects, no discharge present, no inflammatory lesions present, no masses present  · Bladder: non-tender to palpation  · Urethra: appears normal  · Cervix: normal   · Uterus: enlarged about 16 weeks, normal shape, soft  · Adnexa: no adnexal tenderness present, no adnexal masses present  · Perineum: perineum within normal limits, no evidence of trauma, no rashes or skin lesions present  · Anus: anus within normal limits, no hemorrhoids present  · Inguinal Lymph Nodes: no lymphadenopathy present    Skin  · General Inspection: no rash, no lesions identified    Neurologic/Psychiatric  · Mental Status:  · Orientation: grossly oriented to person, place and time  · Mood and Affect: mood normal, affect appropriate    Assessment:   Intrauterine pregnancy with the following problems identified: ? H/O PIH. Plan:     Offered CF testing, CVS, Nuchal Translucency, MSAFP, amnio, and discussed NIPT  Course of pregnancy discussed including visit schedule, routine U/S, glucola testing, etc.  Avoid alcoholic beverages and illicit/recreational drugs use  Take prenatal vitamins or folic acid daily. Hospital and practice style discussed with coverage system.   Discussed nutrition, toxoplasmosis precautions, sexual activity, exercise, need for influenza vaccine, environmental and work hazards, travel advice, screen for domestic violence, need for seat belts. Discussed seafood, unpasteurized dairy products, deli meat, artificial sweeteners, and caffeine. Information on prenatal classes/breastfeeding given. Information on circumcision given  Patient encouraged not to smoke. Discussed current prescription drug use. Given medication list.  Discussed the use of over the counter medications and chemicals. Pt understands risk of hemorrhage during pregnancy and post delivery and would accept blood products if necessary in life-threatening emergencies    She desires testing for [facility]. Will get US in am for dating and RTO later this week for lab work. Handouts given to pt.

## 2018-09-05 LAB
CREAT UR-MCNC: 18.3 MG/DL
PROT UR-MCNC: 5.2 MG/DL
PROT/CREAT UR: 284 MG/G CREAT (ref 0–200)

## 2018-09-06 LAB — BACTERIA UR CULT: NORMAL

## 2018-09-07 ENCOUNTER — ROUTINE PRENATAL (OUTPATIENT)
Dept: OBGYN CLINIC | Age: 33
End: 2018-09-07

## 2018-09-07 VITALS — RESPIRATION RATE: 19 BRPM | WEIGHT: 198 LBS | BODY MASS INDEX: 32.99 KG/M2 | HEIGHT: 65 IN

## 2018-09-07 DIAGNOSIS — Z34.81 NORMAL PREGNANCY IN MULTIGRAVIDA IN FIRST TRIMESTER: Primary | ICD-10-CM

## 2018-09-07 LAB
C TRACH RRNA CVX QL NAA+PROBE: NEGATIVE
CYTOLOGIST CVX/VAG CYTO: NORMAL
CYTOLOGY CVX/VAG DOC THIN PREP: NORMAL
DX ICD CODE: NORMAL
HPV I/H RISK 1 DNA CVX QL PROBE+SIG AMP: NEGATIVE
Lab: NORMAL
N GONORRHOEA RRNA CVX QL NAA+PROBE: NEGATIVE
OTHER STN SPEC: NORMAL
PATH REPORT.FINAL DX SPEC: NORMAL
STAT OF ADQ CVX/VAG CYTO-IMP: NORMAL

## 2018-09-07 NOTE — PROGRESS NOTES
Doing well, advised of US results and EDC, will have panorama and type and screen today. Advised that she can call Jun Mccarthy for pricing and she declines. Wants to have test drawn today. Will get 24 hour baseline urine.

## 2018-09-18 ENCOUNTER — ROUTINE PRENATAL (OUTPATIENT)
Dept: OBGYN CLINIC | Age: 33
End: 2018-09-18

## 2018-09-18 VITALS
BODY MASS INDEX: 34.31 KG/M2 | RESPIRATION RATE: 19 BRPM | SYSTOLIC BLOOD PRESSURE: 99 MMHG | HEIGHT: 64 IN | HEART RATE: 76 BPM | DIASTOLIC BLOOD PRESSURE: 66 MMHG | WEIGHT: 201 LBS

## 2018-09-18 DIAGNOSIS — Z34.81 NORMAL PREGNANCY IN MULTIGRAVIDA IN FIRST TRIMESTER: Primary | ICD-10-CM

## 2018-09-18 DIAGNOSIS — O09.292 HISTORY OF PRE-ECLAMPSIA IN PRIOR PREGNANCY, CURRENTLY PREGNANT, SECOND TRIMESTER: ICD-10-CM

## 2018-09-18 NOTE — PROGRESS NOTES
Doing well, brought in 24hour urine today. She will get Panorama, AFP and blood type and sean screen today. US in 4 weeks.

## 2018-09-21 LAB
ABO GROUP BLD: NORMAL
AFP ADJ MOM SERPL: 0.52
AFP INTERP SERPL-IMP: NORMAL
AFP INTERP SERPL-IMP: NORMAL
AFP SERPL-MCNC: 13.1 NG/ML
AGE AT DELIVERY: 34.9 YR
COMMENT, 018013: NORMAL
GA METHOD: NORMAL
GA: 15.5 WEEKS
IDDM PATIENT QL: NO
MULTIPLE PREGNANCY: NO
NEURAL TUBE DEFECT RISK FETUS: NORMAL %
PROT 24H UR-MRATE: 151 MG/24 HR (ref 30–150)
PROT UR-MCNC: 8.9 MG/DL
RESULTS, 017004: NORMAL
RH BLD: POSITIVE

## 2018-10-02 ENCOUNTER — DOCUMENTATION ONLY (OUTPATIENT)
Dept: FAMILY MEDICINE CLINIC | Age: 33
End: 2018-10-02

## 2018-10-02 NOTE — PROGRESS NOTES
Haven't heard from pt since we met for [facility] Referral on 4/20/18. Referral has been withdrawn.

## 2018-10-17 ENCOUNTER — ROUTINE PRENATAL (OUTPATIENT)
Dept: OBGYN CLINIC | Age: 33
End: 2018-10-17

## 2018-10-17 VITALS
WEIGHT: 200.6 LBS | HEART RATE: 62 BPM | SYSTOLIC BLOOD PRESSURE: 96 MMHG | BODY MASS INDEX: 34.43 KG/M2 | DIASTOLIC BLOOD PRESSURE: 68 MMHG

## 2018-10-17 DIAGNOSIS — Z23 ENCOUNTER FOR IMMUNIZATION: Primary | ICD-10-CM

## 2018-10-17 NOTE — PROGRESS NOTES
FETAL SURVEY  A SINGLE VIABLE IUP AT 19W6D GA BY LMP IS SEEN. FETAL CARDIAC MOTION OBSERVED. FETAL ANATOMY WELL VISUALIZED AND APPEARS WITHIN NORMAL LIMITS. NO ABNORMALITIES ARE SEEN ON TODAY'S EXAM. FETAL 4CH HEART , OUTFLOWS, AND 3VV WERE NOT WELL  VISUALIZED, DUE TO FETAL POSITION. F/U RECOMMENDED. APPROPRIATE FETAL GROWTH IS SEEN. SIZE=DATES. DOTTIE, CERVIX AND PLACENTA APPEAR WITHIN NORMAL LIMITS.   GENDER: XX

## 2018-10-17 NOTE — PROGRESS NOTES
28year old  19w6d pregnant patient given the 0.5ml flu injection as per MD order . Patient signed consent. Patient tolerated injection in left deltoid with out complications.

## 2018-11-14 ENCOUNTER — ROUTINE PRENATAL (OUTPATIENT)
Dept: OBGYN CLINIC | Age: 33
End: 2018-11-14

## 2018-11-14 VITALS
SYSTOLIC BLOOD PRESSURE: 114 MMHG | DIASTOLIC BLOOD PRESSURE: 74 MMHG | BODY MASS INDEX: 34.47 KG/M2 | WEIGHT: 200.8 LBS | HEART RATE: 66 BPM

## 2018-11-14 DIAGNOSIS — Z34.82 PRENATAL CARE, SUBSEQUENT PREGNANCY, SECOND TRIMESTER: Primary | ICD-10-CM

## 2018-11-14 NOTE — Clinical Note
Please send referral to [facility]. Order is in.  You need to call her friend, Diana Monson at [phone] about the appt

## 2018-11-14 NOTE — PROGRESS NOTES
LIMITED OB SCAN/CARDIAC ANATOMY  A SINGLE VERTEX 24W0D IUP IS SEEN. FETAL CARDIAC MOTION OBSERVED. FHR WAS MEASURED SEVERAL  TIMES 111-103BPM.  LIMITED ANATOMY WAS VISUALIZED AND APPEARS WNL. FETAL CARDIAC WAS AGAIN POORLY VISUALIZED. CARDIAC NOT SEEN; LVOT,RVOT,3VV. APPROPRIATE FETAL GROWTH IS SEEN. SIZE=DATES. DOTTIE AND PLACENTA APPEAR WITHIN NORMAL LIMITS.     Will refer to [facility] for f/u US

## 2018-12-04 ENCOUNTER — HOSPITAL ENCOUNTER (OUTPATIENT)
Dept: PERINATAL CARE | Age: 33
Discharge: HOME | End: 2018-12-04
Attending: OBSTETRICS & GYNECOLOGY
Payer: SUBSIDIZED

## 2018-12-04 PROCEDURE — 76805 OB US >/= 14 WKS SNGL FETUS: CPT | Performed by: OBSTETRICS & GYNECOLOGY

## 2018-12-10 ENCOUNTER — ROUTINE PRENATAL (OUTPATIENT)
Dept: OBGYN CLINIC | Age: 33
End: 2018-12-10

## 2018-12-10 VITALS
HEIGHT: 64 IN | DIASTOLIC BLOOD PRESSURE: 85 MMHG | WEIGHT: 207 LBS | BODY MASS INDEX: 35.34 KG/M2 | SYSTOLIC BLOOD PRESSURE: 131 MMHG

## 2018-12-10 DIAGNOSIS — Z34.82 SUPERVISION OF NORMAL INTRAUTERINE PREGNANCY IN MULTIGRAVIDA IN SECOND TRIMESTER: Primary | ICD-10-CM

## 2018-12-26 ENCOUNTER — ROUTINE PRENATAL (OUTPATIENT)
Dept: OBGYN CLINIC | Age: 33
End: 2018-12-26

## 2018-12-26 VITALS
BODY MASS INDEX: 35.34 KG/M2 | RESPIRATION RATE: 19 BRPM | HEART RATE: 80 BPM | WEIGHT: 207 LBS | HEIGHT: 64 IN | SYSTOLIC BLOOD PRESSURE: 133 MMHG | DIASTOLIC BLOOD PRESSURE: 94 MMHG

## 2018-12-26 DIAGNOSIS — O16.3 ELEVATED BLOOD PRESSURE AFFECTING PREGNANCY IN THIRD TRIMESTER, ANTEPARTUM: ICD-10-CM

## 2018-12-26 DIAGNOSIS — Z34.82 SUPERVISION OF NORMAL INTRAUTERINE PREGNANCY IN MULTIGRAVIDA IN SECOND TRIMESTER: Primary | ICD-10-CM

## 2018-12-26 NOTE — PROGRESS NOTES
Good FM  C/o headache and nausea  She never got glucola done at her last visit. Will get glucola today and PIH labs.  F/U next week

## 2018-12-27 LAB
ALBUMIN SERPL-MCNC: 3.4 G/DL (ref 3.5–5.5)
ALBUMIN/GLOB SERPL: 1.2 {RATIO} (ref 1.2–2.2)
ALP SERPL-CCNC: 96 IU/L (ref 39–117)
ALT SERPL-CCNC: 7 IU/L (ref 0–32)
AST SERPL-CCNC: 7 IU/L (ref 0–40)
BASOPHILS # BLD AUTO: 0 X10E3/UL (ref 0–0.2)
BASOPHILS NFR BLD AUTO: 0 %
BILIRUB SERPL-MCNC: <0.2 MG/DL (ref 0–1.2)
BUN SERPL-MCNC: 5 MG/DL (ref 6–20)
BUN/CREAT SERPL: 9 (ref 9–23)
CALCIUM SERPL-MCNC: 8.8 MG/DL (ref 8.7–10.2)
CHLORIDE SERPL-SCNC: 102 MMOL/L (ref 96–106)
CO2 SERPL-SCNC: 17 MMOL/L (ref 20–29)
CREAT SERPL-MCNC: 0.53 MG/DL (ref 0.57–1)
CREAT UR-MCNC: 13.9 MG/DL
EOSINOPHIL # BLD AUTO: 0.5 X10E3/UL (ref 0–0.4)
EOSINOPHIL NFR BLD AUTO: 4 %
ERYTHROCYTE [DISTWIDTH] IN BLOOD BY AUTOMATED COUNT: 13.5 % (ref 12.3–15.4)
GLOBULIN SER CALC-MCNC: 2.8 G/DL (ref 1.5–4.5)
GLUCOSE 1H P 50 G GLC PO SERPL-MCNC: 151 MG/DL (ref 65–139)
GLUCOSE SERPL-MCNC: 145 MG/DL (ref 65–99)
HCT VFR BLD AUTO: 35.8 % (ref 34–46.6)
HGB BLD-MCNC: 11.8 G/DL (ref 11.1–15.9)
IMM GRANULOCYTES # BLD: 0.1 X10E3/UL (ref 0–0.1)
IMM GRANULOCYTES NFR BLD: 1 %
LYMPHOCYTES # BLD AUTO: 2.3 X10E3/UL (ref 0.7–3.1)
LYMPHOCYTES NFR BLD AUTO: 19 %
MCH RBC QN AUTO: 28.3 PG (ref 26.6–33)
MCHC RBC AUTO-ENTMCNC: 33 G/DL (ref 31.5–35.7)
MCV RBC AUTO: 86 FL (ref 79–97)
MONOCYTES # BLD AUTO: 0.8 X10E3/UL (ref 0.1–0.9)
MONOCYTES NFR BLD AUTO: 7 %
NEUTROPHILS # BLD AUTO: 8.5 X10E3/UL (ref 1.4–7)
NEUTROPHILS NFR BLD AUTO: 69 %
PLATELET # BLD AUTO: 335 X10E3/UL (ref 150–379)
POTASSIUM SERPL-SCNC: 3.9 MMOL/L (ref 3.5–5.2)
PROT SERPL-MCNC: 6.2 G/DL (ref 6–8.5)
PROT UR-MCNC: 4.4 MG/DL
PROT/CREAT UR: 317 MG/G CREAT (ref 0–200)
RBC # BLD AUTO: 4.17 X10E6/UL (ref 3.77–5.28)
SODIUM SERPL-SCNC: 137 MMOL/L (ref 134–144)
URATE SERPL-MCNC: 3.7 MG/DL (ref 2.5–7.1)
WBC # BLD AUTO: 12 X10E3/UL (ref 3.4–10.8)

## 2018-12-28 NOTE — PROGRESS NOTES
Patient notified and verbalized understanding  Patient will arrive at 0830 to start 3hr GTT on Wed., 1/2/19

## 2019-01-02 ENCOUNTER — ROUTINE PRENATAL (OUTPATIENT)
Dept: OBGYN CLINIC | Age: 34
End: 2019-01-02

## 2019-01-03 ENCOUNTER — ROUTINE PRENATAL (OUTPATIENT)
Dept: OBGYN CLINIC | Age: 34
End: 2019-01-03

## 2019-01-03 DIAGNOSIS — Z3A.30 30 WEEKS GESTATION OF PREGNANCY: ICD-10-CM

## 2019-01-03 DIAGNOSIS — R73.09 ELEVATED GLUCOSE TOLERANCE TEST: Primary | ICD-10-CM

## 2019-01-04 LAB
ERYTHROCYTE [DISTWIDTH] IN BLOOD BY AUTOMATED COUNT: 13.9 % (ref 12.3–15.4)
GLUCOSE 1H P 100 G GLC PO SERPL-MCNC: 153 MG/DL (ref 65–179)
GLUCOSE 2H P 100 G GLC PO SERPL-MCNC: 129 MG/DL (ref 65–154)
GLUCOSE 3H P 100 G GLC PO SERPL-MCNC: 95 MG/DL (ref 65–139)
GLUCOSE P FAST SERPL-MCNC: 85 MG/DL (ref 65–94)
HCT VFR BLD AUTO: 38.7 % (ref 34–46.6)
HGB BLD-MCNC: 12.6 G/DL (ref 11.1–15.9)
MCH RBC QN AUTO: 28.8 PG (ref 26.6–33)
MCHC RBC AUTO-ENTMCNC: 32.6 G/DL (ref 31.5–35.7)
MCV RBC AUTO: 88 FL (ref 79–97)
NOTE:, 102047: NORMAL
PLATELET # BLD AUTO: 324 X10E3/UL (ref 150–379)
RBC # BLD AUTO: 4.38 X10E6/UL (ref 3.77–5.28)
WBC # BLD AUTO: 12.7 X10E3/UL (ref 3.4–10.8)

## 2019-01-05 ENCOUNTER — HOSPITAL ENCOUNTER (EMERGENCY)
Age: 34
Discharge: HOME | End: 2019-01-05
Attending: OBSTETRICS & GYNECOLOGY | Admitting: OBSTETRICS & GYNECOLOGY
Payer: SUBSIDIZED

## 2019-01-05 VITALS
SYSTOLIC BLOOD PRESSURE: 128 MMHG | BODY MASS INDEX: 35.53 KG/M2 | HEART RATE: 113 BPM | OXYGEN SATURATION: 98 % | RESPIRATION RATE: 18 BRPM | TEMPERATURE: 98.6 F | DIASTOLIC BLOOD PRESSURE: 86 MMHG | HEIGHT: 64 IN

## 2019-01-05 LAB
FLUAV AG NPH QL IA: NEGATIVE
FLUBV AG NOSE QL IA: NEGATIVE

## 2019-01-05 PROCEDURE — 74011250636 HC RX REV CODE- 250/636: Performed by: ADVANCED PRACTICE MIDWIFE

## 2019-01-05 PROCEDURE — 96360 HYDRATION IV INFUSION INIT: CPT

## 2019-01-05 PROCEDURE — 96374 THER/PROPH/DIAG INJ IV PUSH: CPT

## 2019-01-05 PROCEDURE — 96361 HYDRATE IV INFUSION ADD-ON: CPT

## 2019-01-05 PROCEDURE — 75810000275 HC EMERGENCY DEPT VISIT NO LEVEL OF CARE

## 2019-01-05 PROCEDURE — 99285 EMERGENCY DEPT VISIT HI MDM: CPT

## 2019-01-05 PROCEDURE — 87804 INFLUENZA ASSAY W/OPTIC: CPT

## 2019-01-05 RX ORDER — ONDANSETRON 2 MG/ML
4 INJECTION INTRAMUSCULAR; INTRAVENOUS ONCE
Status: COMPLETED | OUTPATIENT
Start: 2019-01-05 | End: 2019-01-05

## 2019-01-05 RX ADMIN — SODIUM CHLORIDE, SODIUM LACTATE, POTASSIUM CHLORIDE, AND CALCIUM CHLORIDE 1000 ML: 600; 310; 30; 20 INJECTION, SOLUTION INTRAVENOUS at 20:36

## 2019-01-05 RX ADMIN — ONDANSETRON 4 MG: 2 SOLUTION INTRAMUSCULAR; INTRAVENOUS at 20:50

## 2019-01-06 NOTE — DISCHARGE INSTRUCTIONS
Patient Education        Influenza (gripe): Instrucciones de cuidado - [ Influenza (Flu): Care Instructions ]  Instrucciones de cuidado    La influenza (gripe) es andrea infección de los pulmones y las vías respiratorias. Es causada por el virus de la influenza. Hay diferentes cepas o tipos de virus de la gripe de un año a otro. A diferencia del resfriado común, la gripe se presenta de [place] repentina y [address], tales alem tos, [place], [place], escalofríos, fatiga y [place], son más intensos. Estos síntomas pueden durar hasta 10 días. Aunque la gripe puede hacerle sentirse muy enfermo, por lo general no causa problemas serios de fabian. Por lo general, todo lo que necesita para los síntomas de la gripe es tratamiento en casa. Kmi bacon médico podría recetarle algún medicamento antiviral para prevenir otros problemas de fabian, alem la neumonía. Las [employer] y quienes tienen un problema de fabian prolongado, alem andrea enfermedad pulmonar, tienen el mayor riesgo de desarrollar neumonía u otros problemas de [place]. La atención de seguimiento es andrea parte clave de bacon tratamiento y seguridad. Asegúrese de hacer y acudir a todas las citas, y llame a bacon médico si está teniendo problemas. También es andrea buena idea saber los resultados de hannah exámenes y mantener andrea lista de los medicamentos que ana. ¿Cómo puede cuidarse en el hogar? · Descanse bastante. · Sierra abundantes líquidos, suficientes para que bacon orina sea de color amarillo je o transparente alem el agua. Si tiene andrea enfermedad del riñón, del corazón o del hígado y tiene que Fany's líquidos, hable con bacon médico antes de aumentar bacon consumo. · Si es necesario, tome un analgésico (medicamento para el dolor) de venta mely, alem acetaminofén (Tylenol), ibuprofeno (Advil, Motrin) o naproxeno (Aleve), para [employer] fiebre, el dolor de [place] y los chelita musculares. Yenni y siga todas las instrucciones de la [place].  Ninguna persona narda de 20 años debe deni aspirina. Ésta ha sido relacionada con el síndrome de [place], andrea enfermedad grave. · No fume. Fumar puede empeorar la gripe. Si necesita ayuda para dejar de fumar, hable con bacon médico [employer] y medicamentos para dejar de fumar. Éstos pueden aumentar hannah probabilidades de dejar el hábito para siempre. · Para ayudar a despejar la nariz congestionada, respire aire húmedo de [place] caliente o un lavabo lleno de [place]. · Antes de usar medicamentos para la tos y los resfriados, revise la [place]. Estos medicamentos podrían no ser seguros para los niños pequeños o las personas con ciertos problemas de [place]. · Si le duele la piel alrededor de la nariz y los labios, aplique un poco de vaselina en la jodie. · Para aliviar la tos:  ? Sierra líquidos para aliviar la comezón de garganta. ? Chupe pastillas para la tos o caramelos duros comunes. ? vLine para la tos de venta mely que contenga dextrometorfano para ayudarle a dormir. Yenni y siga todas las instrucciones de la [place]. ? Use andrea almohada extra en la noche para levantar más la rhonda. [place] podría ayudarlo a descansar si la tos lo mantiene despierto. · [employer] medicamentos recetados exactamente alem le fueron recetados. Llame a bacon médico si nando estar teniendo problemas con bacon medicamento. Cómo evitar propagar la gripe  · [employer] amanda con regularidad y manténgalas alejadas de bacon jazmin. · No vaya a la escuela, al Page [place] o a otros lugares públicos hasta que se sienta mejor y bacon fiebre haya desaparecido por al menos 24 horas. La fiebre debe wang desaparecido por sí misma, sin la ayuda de medicamentos. · Pida a las personas que viven con usted que hablen con hannah médicos sobre la prevención de la gripe. Armin vez les den algún medicamento antiviral para no contraer bacon gripe. · Para prevenir tener la gripe en el futuro, hágase poner la vacuna contra la gripe cada otoño.  Anime a las personas que viven en bacon casa a ponerse la vacuna. · Cúbrase la boca al toser o estornudar. ¿Cuándo debe pedir ayuda? Llame al 911 en cualquier momento que considere que necesita atención de emergencia. Por ejemplo, llame si:    · Tiene serias dificultades para respirar.    Llame a bacon médico ahora mismo o busque atención médica inmediata si:    · Tiene nueva o mayor dificultad para respirar.     · Le parece que está mucho más enfermo.     · Se siente muy somnoliento (con sueño) o confuso.     · Tiene fiebre nueva o más gaby.     · Tiene un salpullido nuevo.    Preste especial atención a los cambios en bacon fabian y asegúrese de comunicarse con baocn médico si:    · Eliezer Reef a mejorar y después empeora otra vez.     · No está mejorando después de 1 semana. ¿Dónde puede encontrar más información en inglés? Armand File a http://familia-amy.info/. Genie Jaquez [address] en la búsqueda para aprender más acerca de \"Influenza (gripe): Instrucciones de cuidado - [ Influenza (Flu): Care Instructions ]. \"  Revisado: 6 diciembre, 2017  Versión del contenido: 11.8  © 2006-2018 Healthwise, Incorporated. Las instrucciones de cuidado fueron adaptadas bajo licencia por [employer] (which disclaims liability or warranty for this information). Si usted tiene [place] afección médica o sobre estas instrucciones, siempre pregunte a bacon profesional de fabian. Healthwise, Incorporated niega toda garantía o responsabilidad por bacon uso de esta información.

## 2019-01-06 NOTE — H&P
History & Physical 
 
Name: Favian Garcia MRN: [id number]  SSN: [id number] YOB: 1985  Age: 28 y.o. Sex: female Subjective:patient was sent to l&d from the ed with complaint of abdominal pain, nausea She denies concerns with her baby. Estimated Date of Delivery: 3/7/19 OB History  Para Term  AB Living 2 1 1 SAB TAB Ectopic Molar Multiple Live Births Ms. Denise Shaffer being evaluated in pregnancy at 31w2d for nausea vomitting and fever. Prenatal course regular. Please see prenatal records for details. Patient Active Problem List  
 Diagnosis  Pregnancy Use US for [facility] 
? H/O of PIH in previous pregnancy Does not speak [place] Panorama-Female, low risk AFP normal 
Flu vaccine 10/17/2018 No specialty comments available. Past Medical History:  
Diagnosis Date  Asthma   
 uses inhaler-4 months ago  Gestational hypertension   
 both pregnancy (pre-eclampsia with first; on Mg postpartum)  Hypertension History reviewed. No pertinent surgical history. Social History Occupational History  Not on file Tobacco Use  Smoking status: Never Smoker  Smokeless tobacco: Never Used Substance and Sexual Activity  Alcohol use: No  
 Drug use: No  
 Sexual activity: Yes  
  Partners: Male Birth control/protection: None Family History Problem Relation Age of Onset  No Known Problems Mother  No Known Problems Father No Known Allergies Prior to Admission medications Medication Sig Start Date End Date Taking? Authorizing Provider  
loratadine (CLARITIN) 10 mg tablet Take 1 Tab by mouth daily. [place] 1 dylan [address] sahra 18   Maribel Paz PA  
fluticasone-salmeterol (ADVAIR HFA) 115-21 mcg/actuation inhaler Take 2 Puffs by inhalation two (2) times a day.  To [facility] once she is qualified 18   BARB Bell  
 albuterol (PROVENTIL HFA, VENTOLIN HFA, PROAIR HFA) 90 mcg/actuation inhaler Take 2 Puffs by inhalation every four (4) hours as needed for Wheezing. To send to [facility] once she is qualified 4/20/18   Karla Paz PA  
montelukast (SINGULAIR) 10 mg tablet Take 1 Tab by mouth daily. [place] [address] barr sahra 4/20/18   Karla Paz PA Review of Systems: A comprehensive review of systems was negative except for that written in the HPI. Objective:  
 
Vitals: 
Vitals:  
 01/05/19 1916 01/05/19 2009 01/05/19 2010 BP:  136/84 128/86 Pulse:  (!) 101 (!) 113 Resp:  18 Temp:  99.8 °F (37.7 °C) SpO2: 98% Height:   5' 4\" (1.626 m) Physical Exam: 
Patient is without distress. She is oriented and cooperative Skin is flushed. Respirations are even and unlabored Abdomen: gravid  s-d Fundus: soft and non tender Fetal Heart Rate: category one Non stress test 
Patient monitored for greater than 20 minutes Fetal heart tone baseline 150 Variability moderate Accelerations present. Deceleration absent Result Reactive Prenatal Labs:  
No results found for: RUBELLAEXT, GRBSEXT, HBSAGEXT, HIVEXT, RPREXT, GONNOEXT, CHLAMEXT, RUBELLAEXT, GRBSEXT, HBSAGEXT, HIVEXT, RPREXT, GONNOEXT, CHLAMEXT Assessment/Plan:siup at 31w 2d with complaints that are consistent with influenza Plan: Rapid influenza, IV hydrate, medicate for nausea Anticipate discharge home Signed By:  Joselin Kelly CNM January 5, 2019

## 2019-01-06 NOTE — PROGRESS NOTES
~1950: The patient arrived by wheelchair with reports of nausea, vomiting, and abdominal cramping since yesterday. The patient states that the last time she ate was around noon today. The patient is accompanied by her significant other and friend Han Duncan). ~2000: Maurice Chinchilla is called and made aware of the patient's arrival and given a brief report. 
~2005: Regine Hyman is at the bedside speaking with the patient. 
~2037: External fetal monitor is removed per Maurice Chinchilla request. 
~6503: The patient is given ice per request. 
~2110: The patient is tolerating ice chips and denies nausea and vomiting. 
~2147: The patient is given verbal and a Cymraes copy of the discharge instructions for flu. The patient verbalizes understanding. Prescriptions are given to the patient for Tamiflu and zofran. All belongings are collected by the patient and she is discharged ambulatory with family.

## 2019-01-06 NOTE — PROGRESS NOTES
VSS 
Feeling better after zofran Rapid influenza reported to be negative Rx for Tamiflu and zofran given with instructions Discharge home

## 2019-01-07 ENCOUNTER — TELEPHONE (OUTPATIENT)
Dept: OBGYN CLINIC | Age: 34
End: 2019-01-07

## 2019-01-07 NOTE — TELEPHONE ENCOUNTER
Result Notes for GLUCOSE, GESTATIONAL, 3 HR TOLERANCE     Notes recorded by Ryan Ordaz MD on 1/7/2019 at 8:35 AM EST  3 hour GTT is normal

## 2019-01-10 ENCOUNTER — ROUTINE PRENATAL (OUTPATIENT)
Dept: OBGYN CLINIC | Age: 34
End: 2019-01-10

## 2019-01-10 ENCOUNTER — HOSPITAL ENCOUNTER (EMERGENCY)
Age: 34
Discharge: HOME | End: 2019-01-10
Attending: OBSTETRICS & GYNECOLOGY | Admitting: OBSTETRICS & GYNECOLOGY
Payer: SUBSIDIZED

## 2019-01-10 VITALS
HEIGHT: 64 IN | WEIGHT: 210 LBS | DIASTOLIC BLOOD PRESSURE: 87 MMHG | HEART RATE: 74 BPM | SYSTOLIC BLOOD PRESSURE: 124 MMHG | RESPIRATION RATE: 18 BRPM | BODY MASS INDEX: 35.85 KG/M2 | OXYGEN SATURATION: 97 % | TEMPERATURE: 98.4 F

## 2019-01-10 VITALS — BODY MASS INDEX: 36.05 KG/M2 | WEIGHT: 210 LBS | DIASTOLIC BLOOD PRESSURE: 94 MMHG | SYSTOLIC BLOOD PRESSURE: 144 MMHG

## 2019-01-10 DIAGNOSIS — O16.3 ELEVATED BLOOD PRESSURE AFFECTING PREGNANCY IN THIRD TRIMESTER, ANTEPARTUM: Primary | ICD-10-CM

## 2019-01-10 LAB
ALBUMIN SERPL-MCNC: 2.3 G/DL (ref 3.5–5)
ALBUMIN/GLOB SERPL: 0.5 {RATIO} (ref 1.1–2.2)
ALP SERPL-CCNC: 137 U/L (ref 45–117)
ALT SERPL-CCNC: 15 U/L (ref 12–78)
ANION GAP SERPL CALC-SCNC: 10 MMOL/L (ref 5–15)
AST SERPL-CCNC: 12 U/L (ref 15–37)
BILIRUB SERPL-MCNC: 0.1 MG/DL (ref 0.2–1)
BUN SERPL-MCNC: 4 MG/DL (ref 6–20)
BUN/CREAT SERPL: 7 (ref 12–20)
CALCIUM SERPL-MCNC: 8.9 MG/DL (ref 8.5–10.1)
CHLORIDE SERPL-SCNC: 104 MMOL/L (ref 97–108)
CO2 SERPL-SCNC: 22 MMOL/L (ref 21–32)
CREAT SERPL-MCNC: 0.6 MG/DL (ref 0.55–1.02)
CREAT UR-MCNC: <13 MG/DL
ERYTHROCYTE [DISTWIDTH] IN BLOOD BY AUTOMATED COUNT: 13.2 % (ref 11.5–14.5)
GLOBULIN SER CALC-MCNC: 4.4 G/DL (ref 2–4)
GLUCOSE SERPL-MCNC: 99 MG/DL (ref 65–100)
HCT VFR BLD AUTO: 36.4 % (ref 35–47)
HGB BLD-MCNC: 12 G/DL (ref 11.5–16)
MCH RBC QN AUTO: 28.6 PG (ref 26–34)
MCHC RBC AUTO-ENTMCNC: 33 G/DL (ref 30–36.5)
MCV RBC AUTO: 86.9 FL (ref 80–99)
NRBC # BLD: 0 K/UL (ref 0–0.01)
NRBC BLD-RTO: 0 PER 100 WBC
PLATELET # BLD AUTO: 353 K/UL (ref 150–400)
PMV BLD AUTO: 9.8 FL (ref 8.9–12.9)
POTASSIUM SERPL-SCNC: 4.1 MMOL/L (ref 3.5–5.1)
PROT SERPL-MCNC: 6.7 G/DL (ref 6.4–8.2)
PROT UR-MCNC: <6 MG/DL (ref 0–11.9)
PROT/CREAT UR-RTO: NORMAL
RBC # BLD AUTO: 4.19 M/UL (ref 3.8–5.2)
SODIUM SERPL-SCNC: 136 MMOL/L (ref 136–145)
WBC # BLD AUTO: 12.3 K/UL (ref 3.6–11)

## 2019-01-10 PROCEDURE — 84156 ASSAY OF PROTEIN URINE: CPT

## 2019-01-10 PROCEDURE — 85027 COMPLETE CBC AUTOMATED: CPT

## 2019-01-10 PROCEDURE — 36415 COLL VENOUS BLD VENIPUNCTURE: CPT

## 2019-01-10 PROCEDURE — 59025 FETAL NON-STRESS TEST: CPT

## 2019-01-10 PROCEDURE — 99285 EMERGENCY DEPT VISIT HI MDM: CPT

## 2019-01-10 PROCEDURE — 80053 COMPREHEN METABOLIC PANEL: CPT

## 2019-01-10 RX ORDER — ONDANSETRON 4 MG/1
4 TABLET, FILM COATED ORAL
COMMUNITY
End: 2020-04-29

## 2019-01-10 RX ORDER — OSELTAMIVIR PHOSPHATE 75 MG/1
75 CAPSULE ORAL 2 TIMES DAILY
COMMUNITY
End: 2019-02-10

## 2019-01-10 NOTE — DISCHARGE INSTRUCTIONS
Patient Education   Patient Education   Patient Education     CALL 'S OFFICE TO SCHEDULE AN APPOINTMENT FOR NEXT WEEK. Preeclampsia: Instrucciones de cuidado - [ Preeclampsia: Care Instructions ]  Instrucciones de cuidado    La preeclampsia ocurre cuando la presión arterial de andrea constanza se eleva rogers el embarazo. Con frecuencia, las mujeres que tienen preeclampsia también tienen hinchazón de las piernas, las amanda y la jazmin. Un examen podría mostrar un exceso de proteína en bacon orina. La preeclampsia también se conoce alem toxemia. Si la preeclampsia es grave y no se trata, puede causar convulsiones (eclampsia) y dañar el hígado o los riñones. La preeclampsia puede evitar que bacon bebé reciba suficientes nutrientes y oxígeno. [place] le puede causar bajo peso al nacer u otros problemas. Bacon médico le vigilará con atención para prevenir estos problemas. También puede recomendarle que repose en cama la mayor parte del día. Si la preeclampsia es un peligro para bacon fabian o la de bacon bebé, es posible que bacon médico deba inducir el Dare. Aunque la preeclampsia es motivo de preocupación, la mayoría de las mujeres con luis eduardo problema tienen bebés sanos. Por lo general, la preeclampsia desaparece por sí chapito después del parto. La atención de seguimiento es andrea parte clave de bacon tratamiento y seguridad. Asegúrese de hacer y acudir a todas las citas, y llame a bacon médico si está teniendo problemas. También es andrea buena idea saber los resultados de hannah exámenes y mantener andrea lista de los medicamentos que ana. ¿Cómo puede cuidarse en el hogar? · Si bacon médico se lo indica, tómese y registre bacon presión arterial en la casa. ? Aprenda la importancia de las [address] 50 de presión arterial (por ejemplo 120 sobre 80 ó 120/80). El primer número es la presión sistólica, es decir, la fuerza que ejerce la shilpi sobre las mims arteriales cuando el corazón late.  El boo número es la presión diastólica, es decir, la fuerza que ejerce la [employer] las mims arteriales entre latido y latido, cuando el corazón está en reposo. Hay varias opciones para monitorear la presión. ? Monitor manual: Usted infla el manguito y [place] un estetoscopio para escuchar el pulso. ? Monitor electrónico: El manguito se infla y un indicador muestra el pulso. ? Para deni bacon presión arterial:  ? Pídale a bacon médico que revise bacon monitor de presión arterial para asegurarse de que es preciso y que el manguito es de la medida correcta. Pídale también que le observe para asegurarse de que lo está usando de forma correcta. ? No tome alimentos, medicamentos que eleven la presión arterial (alem ciertos aerosoles nasales descongestionantes) o productos del tabaco antes de deni bacon presión arterial.  ? Evite tomarse la presión si acaba de hacer ejercicio o está nerviosa o Maria De Jesus Horsfall. Descanse por lo menos 15 minutos antes de tomarse la presión arterial.  · Si bacon médico lo recomienda, revise los niveles de proteína en la orina. Bacon médico o enfermero(a) le mostrará cómo hacerlo. · Linville hannah medicamentos exactamente alem le fueron recetados. Llame a bacon médico si nando estar teniendo problemas con bacon medicamento. · No fume. Dejar de fumar ayudará a reducir bacon presión arterial y mejorará el desarrollo y la fabian de bacon bebé. Si necesita ayuda para dejar de fumar, hable con bacon médico [employer] y medicamentos para dejar de fumar. Éstos pueden aumentar hannah probabilidades de dejar el hábito para siempre. · Lleve andrea Andrea Rank y saludable que incluya muchas frutas y vegetales. · Si el médico le recomendó Ross [employer] Us, asegúrese de hacerlo y descansar todo lo que sea posible. ? Tenga un teléfono, guía telefónica, libreta y bolígrafo cerca de la cama donde pueda alcanzarlos fácilmente. ? Estire las piernas con suavidad cada hora para mantener un buen flujo de Yrn.   ? Pídale a un miembro de bacon sean que empaque refrigerios y almuerzos en [place] Pour nevera pequeña cerca de bacon cama. ? Use luis eduardo tiempo para hacer actividades para las que, por lo general, no encuentra tiempo, alem leer, hacer manualidades o escribir cartas. · Usted puede darle seguimiento a la fabian de bacon bebé anotando la cantidad de tiempo necesaria para contar 10 movimientos (alem patadas, vibraciones o giros). Se considera normal sentir 10 movimientos en menos de 1 hora. Gilbert seguimiento a los movimientos de bacon bebé andrea vez al día y lleve debbie registro a cada consulta prenatal.  ¿Cuándo debe pedir ayuda? Llame al 911 en cualquier momento que considere que necesita atención de emergencia. Por ejemplo, llame si:    · Tiene un episodio de convulsiones.     · Se desmayó (perdió el conocimiento).     · Tiene sangrado vaginal intenso.     · Tiene dolor intenso en el vientre o la pelvis.     · Le sale abundante líquido o gotea de la vagina y sabe o nando que el cordón umbilical se está saliendo a bacon vagina. Si esto sucede, arrodíllese de inmediato, de shay forma que hannah nalgas estén más altas que bacon rhonda. Rosa Sanchez disminuirá la presión sobre el cordón umbilical hasta que llegue la ayuda.    Llame a bacon médico ahora mismo o busque atención médica inmediata si:    · La jazmin, las amanda o los pies se le hinchan repentinamente.     · Tiene nuevos problemas de la visión (alem oscurecimiento de la visión o visión borrosa).   · Tiene un dolor de rhonda intenso.     · Tiene cualquier sangrado vaginal.     · Tiene dolor de vientre o cólicos (retortijones).     · Tiene fiebre.     · Ha tenido contracciones regulares (con o sin dolor) por Debria Leigh.  Rosa Sanchez significa que tiene 8 o más contracciones en 1 hora o que tiene 4 contracciones o más en 20 minutos después de [place] de posición y deni líquidos.     · Le sale líquido de la vagina de manera repentina.     · Tiene dolor en la parte baja de la espalda o presión en la pelvis que no desaparece.     · Nota que bacon bebé ha dejado de moverse o se mueve mucho menos de lo normal.    Preste especial atención a los cambios en bacon fabian y asegúrese de comunicarse con bacon médico si tiene preguntas. ¿Dónde puede encontrar más información en inglés? Arlen Nations a http://familia-amy.info/. Kevin Yenni E890 en la búsqueda para aprender más acerca de \"Preeclampsia: Instrucciones de cuidado - [ Preeclampsia: Care Instructions ]. \"  Revisado: 21 noviembre, 2017  Versión del contenido: 11.8  © 8771-9341 Healthwise, Incorporated. Las instrucciones de cuidado fueron adaptadas bajo licencia por [employer] (which disclaims liability or warranty for this information). Si usted tiene [place] afección médica o sobre estas instrucciones, siempre pregunte a bacon profesional de fabian. Healthwise, Incorporated niega toda garantía o responsabilidad por bacon uso de esta información. Contracciones de Jesse Rodney: Instrucciones de cuidado - [ Jesse Rodney Contractions: Care Instructions ]  Instrucciones de cuidado    Las contracciones de Jesse Leobardoeder le preparan el útero para el Flateyri. Piense en ellas alem si fueran un ejercicio de \"precalentamiento\" que hace bacon cuerpo. Puede comenzar a sentirlas [employer] 28 y 27 del embarazo. Kim comienzan tan temprano alem en la semana 20. Las contracciones de Jesse Rodney por lo general ocurren con mayor frecuencia rogers el noveno mes. Pueden desaparecer cuando usted Junius Verdugo y regresar cuando descansa. Estas contracciones son alem contracciones leves del Page Finney de [place] real, aunque ocurren con narda frecuencia. (Usted siente menos de 8 en Chelsey Georgian). No causan dilatación del aubree uterino. Puede resultarle difícil diferenciar [employer] contracciones de Jesse Rodney y el trabajo de parto real, sobre todo rogers el primer [facility]. La atención de seguimiento es andrea parte clave de bacon tratamiento y seguridad.  Asegúrese de hacer y acudir a todas las citas, y llame a bacon médico si está teniendo problemas. También es andrea buena idea saber los resultados de hannah exámenes y mantener andrea lista de los medicamentos que ana. ¿Cómo puede cuidarse en el hogar? · Pruebe un baño tibio para ayudar a aliviar la tensión muscular y reducir el dolor. · Cambie de posición cada 30 minutos. Tómese descansos si tiene que estar sentada por [employer]. Levántese a caminar. · Sierra abundante agua, suficiente alem para que bacon orina sea de color amarillo je o shon alem el [place]. · Hacer unas caminatas cortas puede ayudarla a sentirse mejor. Las contracciones que se vuelvan más hernandez o más frecuentes deben ser evaluadas por bacon médico.  ¿Dónde puede encontrar más información en inglés? Sharonda Nicole a http://familia-amy.info/. Joanna Res U060 en la búsqueda para aprender más acerca de \"Contracciones de [place]: Instrucciones de cuidado - [ [place] Contractions: Care Instructions ]. \"  Revisado: 21 noviembre, 2017  Versión del contenido: 11.8  © 2673-1593 Healthwise, Incorporated. Las instrucciones de cuidado fueron adaptadas bajo licencia por Good Help Connections (which disclaims liability or warranty for this information). Si usted tiene [place] afección médica o sobre estas instrucciones, siempre pregunte a bacon profesional de fabian. Healthwise, Incorporated niega toda garantía o responsabilidad por bacon uso de esta información. Conteo de las patadas de bacon bebé: Instrucciones de cuidado - [ Ciera Ledesma Your [de-identified] Ellie: Care Instructions ]  Instrucciones de cuidado    Contar las patadas de bacon bebé es andrea manera en la que bacon médico puede establecer si bacon bebé es saludable. La mayoría de las [facility], sobre todo en el primer embarazo, siente que bacon bebé se mueve por primera vez entre las semanas 12 y 25. El movimiento puede sentirse más alem aleteos que alem patadas. Es posible que bacon bebé se mueva más a ciertas horas del día.  Cuando usted está [place], puede notar menos patadas que cuando está descansando. En hannah visitas prenatales, bacon médico le preguntará si el bebé está activo. En el último trimestre, bacon médico le puede pedir que cuente la cantidad de veces que sienta que el bebé se [place]. La atención de seguimiento es andrea parte clave de bacon tratamiento y seguridad. Asegúrese de hacer y acudir a todas las citas, y llame a bacon médico si está teniendo problemas. También es andrea buena idea saber los resultados de hannah exámenes y mantener andrea lista de los medicamentos que ana. ¿Cómo se cuentan las patadas fetales? · Un método común para revisar el movimiento de bacon bebé es contar la cantidad de patadas o movimientos que sienta en 1 hora. Es normal sentir 10 movimientos (alem patadas, aleteos o vueltas) en 1 hora. Algunos médicos sugieren que cuente rogers la [facility] llegar a los 10 movimientos. Luego usted puede dejar de contar por debbie día y comenzar otra vez al día siguiente. · Para contar, elija el momento del día en que bacon bebé esté más activo. Podría ser cualquier momento entre la mañana y la noche. · Si no siente 10 movimientos en andrea hora, es posible que bacon bebé esté durmiendo. Espere hasta la próxima hora y vuelva a contar. ¿Cuándo debe pedir ayuda? Llame a bacon médico ahora mismo o busque atención médica inmediata si:    · Siente que bacon bebé ha dejado de moverse o se mueve mucho menos de lo normal.    Preste especial atención a los cambios en bacon fabian y asegúrese de comunicarse con bacon médico si tiene cualquier problema. ¿Dónde puede encontrar más información en inglés? Cassie Hernández a http://familia-amy.info/. Mendy Doss H528 en la búsqueda para aprender más acerca de \"Conteo de las patadas de bacon bebé: Instrucciones de cuidado - [ Counting Your Baby's Kicks: Care Instructions ]. \"  Revisado: 21 noviembre, 2017  Versión del contenido: 11.8  © 8988-8291 Healthwise, Incorporated.  Las instrucciones de cuidado fueron adaptadas bajo licencia por Good Help Connections (which disclaims liability or warranty for this information). Si usted tiene [place] afección médica o sobre estas instrucciones, siempre pregunte a bacon profesional de fabian. [facility], Incorporated niega toda garantía o responsabilidad por bacon uso de esta información.

## 2019-01-10 NOTE — PROGRESS NOTES
08:36- Pt arrived from office for evaluation of PIH.     09:15- Pt states came to ED last weekend for vomiting, fever, abdominal pain. Flu A and B tests negative. Pt was given tamiflu and zofran Rx.     10:26- MD Kody notified of pt lab results. BP 120s/ 80s. Reactive NST. MD states pt may be D/C'd home. To F/U in office next week. Pt will probably be seen weekly to monitor BP    10:51- [place]  # [id number] used to inform pt that lab work is normal, BP 120s/80s improving. MD Kody states pt may be D/C'd home. D/C instructions reviewed verbally with . Discussed s/sx of labor and s/sx of Pre-E. Pt to call MD Kody office if develop sx. Pt to call office today to schedule appt with Yasmeen Dia MD for next week to F/U. Pt may need to be seen weekly. Pt agreeable to information and plan of care. IV removed. Discharge paperwork will be printed in [address] Pt to call someone for a ride. 11:21- Pt ambulatory and left unit. Ride waiting for pt.

## 2019-01-16 ENCOUNTER — ROUTINE PRENATAL (OUTPATIENT)
Dept: OBGYN CLINIC | Age: 34
End: 2019-01-16

## 2019-01-16 VITALS
SYSTOLIC BLOOD PRESSURE: 118 MMHG | DIASTOLIC BLOOD PRESSURE: 83 MMHG | HEART RATE: 80 BPM | HEIGHT: 64 IN | RESPIRATION RATE: 19 BRPM | WEIGHT: 209 LBS | BODY MASS INDEX: 35.68 KG/M2

## 2019-01-16 DIAGNOSIS — Z23 ENCOUNTER FOR IMMUNIZATION: ICD-10-CM

## 2019-01-16 DIAGNOSIS — Z34.83 ENCOUNTER FOR SUPERVISION OF OTHER NORMAL PREGNANCY IN THIRD TRIMESTER: Primary | ICD-10-CM

## 2019-01-16 NOTE — PROGRESS NOTES
After obtaining consent, and per orders of dr Jaron Loredo, injection of tdap given in left deltoid by Rolanda Browne RN. Patient instructed to remain in clinic for 20 minutes afterwards, and to report any adverse reaction to me immediately. Lot: [id number] Exp: 5/3/20 NDC: 36718-381-28 , VIS given.

## 2019-01-28 ENCOUNTER — ROUTINE PRENATAL (OUTPATIENT)
Dept: OBGYN CLINIC | Age: 34
End: 2019-01-28

## 2019-01-28 VITALS
RESPIRATION RATE: 19 BRPM | HEIGHT: 64 IN | SYSTOLIC BLOOD PRESSURE: 131 MMHG | DIASTOLIC BLOOD PRESSURE: 88 MMHG | HEART RATE: 67 BPM | BODY MASS INDEX: 36.37 KG/M2 | WEIGHT: 213 LBS

## 2019-01-28 DIAGNOSIS — Z34.83 ENCOUNTER FOR SUPERVISION OF OTHER NORMAL PREGNANCY IN THIRD TRIMESTER: Primary | ICD-10-CM

## 2019-02-02 LAB — GRBS, EXTERNAL: NEGATIVE

## 2019-02-04 ENCOUNTER — ROUTINE PRENATAL (OUTPATIENT)
Dept: OBGYN CLINIC | Age: 34
End: 2019-02-04

## 2019-02-04 VITALS
SYSTOLIC BLOOD PRESSURE: 126 MMHG | DIASTOLIC BLOOD PRESSURE: 93 MMHG | WEIGHT: 212.6 LBS | HEIGHT: 64 IN | HEART RATE: 85 BPM | BODY MASS INDEX: 36.29 KG/M2

## 2019-02-04 DIAGNOSIS — Z34.83 ENCOUNTER FOR SUPERVISION OF OTHER NORMAL PREGNANCY, THIRD TRIMESTER: Primary | ICD-10-CM

## 2019-02-04 NOTE — PROGRESS NOTES
Having some abdominal aches and pains, given labor precautions; GBS sent; f/u in office 2 days to check BP

## 2019-02-06 ENCOUNTER — ROUTINE PRENATAL (OUTPATIENT)
Dept: OBGYN CLINIC | Age: 34
End: 2019-02-06

## 2019-02-06 ENCOUNTER — HOSPITAL ENCOUNTER (INPATIENT)
Age: 34
LOS: 3 days | Discharge: HOME | DRG: 560 | End: 2019-02-10
Attending: OBSTETRICS & GYNECOLOGY | Admitting: OBSTETRICS & GYNECOLOGY
Payer: MEDICAID

## 2019-02-06 VITALS
HEIGHT: 65 IN | DIASTOLIC BLOOD PRESSURE: 94 MMHG | BODY MASS INDEX: 35.65 KG/M2 | SYSTOLIC BLOOD PRESSURE: 156 MMHG | RESPIRATION RATE: 20 BRPM | WEIGHT: 214 LBS

## 2019-02-06 DIAGNOSIS — R52 POSTPARTUM PAIN: Primary | ICD-10-CM

## 2019-02-06 DIAGNOSIS — O16.3 ELEVATED BLOOD PRESSURE AFFECTING PREGNANCY IN THIRD TRIMESTER, ANTEPARTUM: Primary | ICD-10-CM

## 2019-02-06 PROBLEM — O14.93 PREECLAMPSIA, THIRD TRIMESTER: Status: ACTIVE | Noted: 2019-02-06

## 2019-02-06 LAB
ALBUMIN SERPL-MCNC: 2.3 G/DL (ref 3.5–5)
ALBUMIN/GLOB SERPL: 0.5 {RATIO} (ref 1.1–2.2)
ALP SERPL-CCNC: 99 U/L (ref 45–117)
ALT SERPL-CCNC: 13 U/L (ref 12–78)
ANION GAP SERPL CALC-SCNC: 13 MMOL/L (ref 5–15)
AST SERPL-CCNC: 17 U/L (ref 15–37)
BASOPHILS # BLD: 0.1 K/UL (ref 0–0.1)
BASOPHILS NFR BLD: 1 % (ref 0–1)
BILIRUB SERPL-MCNC: 0.1 MG/DL (ref 0.2–1)
BUN SERPL-MCNC: 9 MG/DL (ref 6–20)
BUN/CREAT SERPL: 14 (ref 12–20)
CALCIUM SERPL-MCNC: 8.8 MG/DL (ref 8.5–10.1)
CHLORIDE SERPL-SCNC: 103 MMOL/L (ref 97–108)
CO2 SERPL-SCNC: 22 MMOL/L (ref 21–32)
CREAT SERPL-MCNC: 0.64 MG/DL (ref 0.55–1.02)
CREAT UR-MCNC: 28.63 MG/DL
DIFFERENTIAL METHOD BLD: ABNORMAL
EOSINOPHIL # BLD: 0.4 K/UL (ref 0–0.4)
EOSINOPHIL NFR BLD: 3 % (ref 0–7)
ERYTHROCYTE [DISTWIDTH] IN BLOOD BY AUTOMATED COUNT: 13.3 % (ref 11.5–14.5)
GLOBULIN SER CALC-MCNC: 4.3 G/DL (ref 2–4)
GLUCOSE SERPL-MCNC: 76 MG/DL (ref 65–100)
GP B STREP DNA SPEC QL NAA+PROBE: NEGATIVE
HCT VFR BLD AUTO: 39 % (ref 35–47)
HGB BLD-MCNC: 12.3 G/DL (ref 11.5–16)
IMM GRANULOCYTES # BLD AUTO: 0.1 K/UL (ref 0–0.04)
IMM GRANULOCYTES NFR BLD AUTO: 1 % (ref 0–0.5)
LYMPHOCYTES # BLD: 2.9 K/UL (ref 0.8–3.5)
LYMPHOCYTES NFR BLD: 24 % (ref 12–49)
MCH RBC QN AUTO: 28 PG (ref 26–34)
MCHC RBC AUTO-ENTMCNC: 31.5 G/DL (ref 30–36.5)
MCV RBC AUTO: 88.6 FL (ref 80–99)
MONOCYTES # BLD: 1 K/UL (ref 0–1)
MONOCYTES NFR BLD: 8 % (ref 5–13)
NEUTS SEG # BLD: 7.6 K/UL (ref 1.8–8)
NEUTS SEG NFR BLD: 64 % (ref 32–75)
NRBC # BLD: 0 K/UL (ref 0–0.01)
NRBC BLD-RTO: 0 PER 100 WBC
PLATELET # BLD AUTO: 315 K/UL (ref 150–400)
PMV BLD AUTO: 10.9 FL (ref 8.9–12.9)
POTASSIUM SERPL-SCNC: 4.3 MMOL/L (ref 3.5–5.1)
PROT SERPL-MCNC: 6.6 G/DL (ref 6.4–8.2)
PROT UR-MCNC: 15 MG/DL (ref 0–11.9)
PROT/CREAT UR-RTO: 0.5
RBC # BLD AUTO: 4.4 M/UL (ref 3.8–5.2)
SODIUM SERPL-SCNC: 138 MMOL/L (ref 136–145)
WBC # BLD AUTO: 11.9 K/UL (ref 3.6–11)

## 2019-02-06 PROCEDURE — 85025 COMPLETE CBC W/AUTO DIFF WBC: CPT

## 2019-02-06 PROCEDURE — 99218 HC RM OBSERVATION: CPT

## 2019-02-06 PROCEDURE — 84156 ASSAY OF PROTEIN URINE: CPT

## 2019-02-06 PROCEDURE — 74011250637 HC RX REV CODE- 250/637: Performed by: OBSTETRICS & GYNECOLOGY

## 2019-02-06 PROCEDURE — 36415 COLL VENOUS BLD VENIPUNCTURE: CPT

## 2019-02-06 PROCEDURE — 4A0HXCZ MEASUREMENT OF PRODUCTS OF CONCEPTION, CARDIAC RATE, EXTERNAL APPROACH: ICD-10-PCS | Performed by: OBSTETRICS & GYNECOLOGY

## 2019-02-06 PROCEDURE — 80053 COMPREHEN METABOLIC PANEL: CPT

## 2019-02-06 PROCEDURE — 74011250636 HC RX REV CODE- 250/636: Performed by: OBSTETRICS & GYNECOLOGY

## 2019-02-06 RX ORDER — ONDANSETRON 2 MG/ML
4 INJECTION INTRAMUSCULAR; INTRAVENOUS
Status: DISCONTINUED | OUTPATIENT
Start: 2019-02-06 | End: 2019-02-08

## 2019-02-06 RX ORDER — SODIUM CHLORIDE 0.9 % (FLUSH) 0.9 %
5-40 SYRINGE (ML) INJECTION AS NEEDED
Status: DISCONTINUED | OUTPATIENT
Start: 2019-02-06 | End: 2019-02-08

## 2019-02-06 RX ORDER — ZOLPIDEM TARTRATE 5 MG/1
5 TABLET ORAL
Status: DISCONTINUED | OUTPATIENT
Start: 2019-02-06 | End: 2019-02-08

## 2019-02-06 RX ORDER — ACETAMINOPHEN 325 MG/1
650 TABLET ORAL
COMMUNITY
End: 2020-04-29

## 2019-02-06 RX ORDER — ACETAMINOPHEN 325 MG/1
650 TABLET ORAL
Status: DISCONTINUED | OUTPATIENT
Start: 2019-02-06 | End: 2019-02-08

## 2019-02-06 RX ADMIN — ACETAMINOPHEN 650 MG: 325 TABLET ORAL at 19:46

## 2019-02-06 RX ADMIN — SODIUM CHLORIDE, SODIUM LACTATE, POTASSIUM CHLORIDE, AND CALCIUM CHLORIDE 1000 ML: 600; 310; 30; 20 INJECTION, SOLUTION INTRAVENOUS at 16:02

## 2019-02-06 RX ADMIN — ONDANSETRON 4 MG: 2 INJECTION INTRAMUSCULAR; INTRAVENOUS at 16:02

## 2019-02-06 NOTE — PROGRESS NOTES
1500 Pt is 35w6d g2pa [place] speaking sent to L&D from appt to  r/o pree. Pt states she had her first baby in [facility] [address] Pt denies any ha, blurred vision or epigastric pain. Pt states she has been vomiting for 3 days and has experienced diarrhea x2 days. Pt has [address] speaking friend at bedside. 1 Dr Chilango Ybarra updated on pts bp's and c/o nausea and diarrhea. Orders received for piv bolus and Zofran 4mg ivp and start 24hr urine. 1605 24hr urine started. 1044 [address] Dr Sheila Garcia at bedside discussing plan of care.  
 
1725 Shift report given to Alba Ferguson RN

## 2019-02-06 NOTE — PROGRESS NOTES
1720: Received bedside SBAR report from Tana Espinoza RN, assumed care of patient at this time. Patient no longer nauseated, resting quietly in bed with eyes closed. 1740: patient continues to have elevated pressures. RN at bedside to measure arm for correct BP cuff sizing, 34cm - swapped to larger cuff for most accurate BP. Patient spenser, denies feeling UC's. 
4640: RN at bedside with Vahe Juan C translating, patient feels occasional contractions for the last 20 minutes, but thinks it is because she needs to use the restroom. Patient up to restroom at this time.   
1845: patient states that contractions have decreased since she used restroom, will notify RN if they persist.

## 2019-02-06 NOTE — H&P
History & Physical 
 
Name: Quang Espinosa MRN: [id number]  SSN: [id number] YOB: 1985  Age: 28 y.o. Sex: female Subjective:  
 
Reason for Admission:  Pregnancy and high blood pressure History of Present Illness: Pt is 35 6/7 weeks and was seen in the office today. BP noted to be elevated and 1+ urine protein. She was sent to L&D to r/o PIH. OB History  Para Term  AB Living 2 1 1 SAB TAB Ectopic Molar Multiple Live Births Past Medical History:  
Diagnosis Date  Asthma   
 uses inhaler-4 months ago  Gestational hypertension   
 both pregnancy (pre-eclampsia with first; on Mg postpartum)  Hypertension No past surgical history on file. Social History Socioeconomic History  Marital status: SINGLE Spouse name: Not on file  Number of children: Not on file  Years of education: Not on file  Highest education level: Not on file Social Needs  Financial resource strain: Not on file  Food insecurity - worry: Not on file  Food insecurity - inability: Not on file  Transportation needs - medical: Not on file  Transportation needs - non-medical: Not on file Occupational History  Not on file Tobacco Use  Smoking status: Never Smoker  Smokeless tobacco: Never Used Substance and Sexual Activity  Alcohol use: No  
 Drug use: No  
 Sexual activity: Yes  
  Partners: Male Birth control/protection: None Other Topics Concern [address] Service Not Asked  Blood Transfusions Not Asked  Caffeine Concern Not Asked  Occupational Exposure Not Asked Ender Rafa Hazards Not Asked  Sleep Concern Not Asked  Stress Concern Not Asked  Weight Concern Not Asked  Special Diet Not Asked  Back Care Not Asked  Exercise Not Asked  Bike Helmet Not Asked  Seat Belt Not Asked  Self-Exams Not Asked Social History Narrative  Not on file Family History Problem Relation Age of Onset  No Known Problems Mother  No Known Problems Father No Known Allergies Prior to Admission medications Medication Sig Start Date End Date Taking? Authorizing Provider  
acetaminophen (TYLENOL) 325 mg tablet Take 650 mg by mouth every four (4) hours as needed for Pain. Yes Provider, Historical  
PNV No12-Iron-FA-DSS-OM-3 29 mg iron-1 mg -50 mg CPKD Take 1 Tab by mouth daily. Yes Provider, Historical  
albuterol (PROVENTIL HFA, VENTOLIN HFA, PROAIR HFA) 90 mcg/actuation inhaler Take 2 Puffs by inhalation every four (4) hours as needed for Wheezing. To send to [facility] once she is qualified 4/20/18  Yes Maxey Severe, PA  
oseltamivir (TAMIFLU) 75 mg capsule Take 75 mg by mouth two (2) times a day. Provider, Historical  
ondansetron hcl (ZOFRAN) 4 mg tablet Take 4 mg by mouth every eight (8) hours as needed for Nausea. Provider, Historical  
loratadine (CLARITIN) 10 mg tablet Take 1 Tab by mouth daily. Welch 1 pastilla cada sahra 4/20/18   Herman Paz PA  
montelukast (SINGULAIR) 10 mg tablet Take 1 Tab by mouth daily. Welch 1 pastilla cada sahra 4/20/18   Herman Paz PA Review of Systems: A comprehensive review of systems was negative except for that written in the History of Present Illness. Objective:  
 
Vitals:   
Vitals:  
 02/06/19 1522 02/06/19 1532 02/06/19 1537 02/06/19 1538 BP:    (!) 150/97 Pulse:    62 SpO2: 100% 100% 100% No data recorded. BP  Min: 150/97  Max: 158/99 Physical Exam: 
Chest: clear Heart: RRR Abdomen: gravid Extremities :2/4 edema Cervix: 50%/closed Membranes:  intact Uterine Activity: none Fetal Heart Rate:  present Labs: No results found for this or any previous visit (from the past 24 hour(s)). Assessment and Plan:  
 
Assessment: IUP 35/6/7 weeks with elevated BP Plan: Monitor in L&D, serial BP, PIH labs, urine Prot/creat ratio, start 24 hour urine collection Signed By:  Humberto Herzog MD   
 February 6, 2019

## 2019-02-06 NOTE — PROGRESS NOTES
PIH labs normal except for prot/creat ratio of 0.5. BP non severe except one reading.  Will monitor overnight and she will see MFM in am.

## 2019-02-07 LAB
COLLECT DURATION TIME UR: 24 HR
PROT 24H UR-MRATE: 492 MG/24HR
SPECIMEN VOL ?TM UR: 4100 ML

## 2019-02-07 PROCEDURE — 99218 HC RM OBSERVATION: CPT

## 2019-02-07 PROCEDURE — 74011250637 HC RX REV CODE- 250/637: Performed by: OBSTETRICS & GYNECOLOGY

## 2019-02-07 PROCEDURE — 75410000000 HC DELIVERY VAGINAL/SINGLE: Performed by: OBSTETRICS & GYNECOLOGY

## 2019-02-07 PROCEDURE — 75410000003 HC RECOV DEL/VAG/CSECN EA 0.5 HR: Performed by: OBSTETRICS & GYNECOLOGY

## 2019-02-07 PROCEDURE — 77010026065 HC OXYGEN MINIMUM MEDICAL AIR: Performed by: OBSTETRICS & GYNECOLOGY

## 2019-02-07 PROCEDURE — 77030028565 HC CATH CERV RIPNG BLN COOK -B

## 2019-02-07 PROCEDURE — 65270000029 HC RM PRIVATE

## 2019-02-07 PROCEDURE — 74011250636 HC RX REV CODE- 250/636: Performed by: OBSTETRICS & GYNECOLOGY

## 2019-02-07 PROCEDURE — 76060000078 HC EPIDURAL ANESTHESIA: Performed by: ANESTHESIOLOGY

## 2019-02-07 PROCEDURE — 75410000002 HC LABOR FEE PER 1 HR: Performed by: OBSTETRICS & GYNECOLOGY

## 2019-02-07 RX ORDER — NALOXONE HYDROCHLORIDE 0.4 MG/ML
0.4 INJECTION, SOLUTION INTRAMUSCULAR; INTRAVENOUS; SUBCUTANEOUS AS NEEDED
Status: DISCONTINUED | OUTPATIENT
Start: 2019-02-07 | End: 2019-02-08 | Stop reason: HOSPADM

## 2019-02-07 RX ORDER — NALBUPHINE HYDROCHLORIDE 10 MG/ML
10 INJECTION, SOLUTION INTRAMUSCULAR; INTRAVENOUS; SUBCUTANEOUS
Status: DISCONTINUED | OUTPATIENT
Start: 2019-02-07 | End: 2019-02-08

## 2019-02-07 RX ORDER — OXYTOCIN/0.9 % SODIUM CHLORIDE 30/500 ML
0-20 PLASTIC BAG, INJECTION (ML) INTRAVENOUS
Status: DISCONTINUED | OUTPATIENT
Start: 2019-02-08 | End: 2019-02-08

## 2019-02-07 RX ADMIN — ZOLPIDEM TARTRATE 5 MG: 5 TABLET ORAL at 23:39

## 2019-02-07 RX ADMIN — Medication 10 ML: at 15:34

## 2019-02-07 RX ADMIN — NALBUPHINE HYDROCHLORIDE 10 MG: 10 INJECTION, SOLUTION INTRAMUSCULAR; INTRAVENOUS; SUBCUTANEOUS at 06:09

## 2019-02-07 NOTE — PROGRESS NOTES
S/ no c/o, less nausea this am 
 
O/ BP better 113-145/70-90 past 12 hours Abd; gravid, NT Ext: 1/4 edema, 1/4 DTR Labs: PIH labs normal except Prot/Creat ratio 0.5 A/ IUP 36 0/7 with PIH with non severe features, BP better overnight P/ 24 hour urine in progress, to see MFM today

## 2019-02-07 NOTE — PROGRESS NOTES
Indication: Unspecified Pre-E 3rd Tri 014.93.  
____________________________________________________________________________ History: Age: 28 years. ____________________________________________________________________________ Dating: 
Current Scan on: 02/07/19 EDC: 03/25/19 GA by current scan: 33w3d Best Overall Assessment: 12/04/18 EDC: 03/07/19 Assessed GA: 36w0d The calculation of the gestational age by current scan was based on BPD, OFD, HC, AC and FL. The Best Overall Assessment is based on an earlier assessment on 09/05/18 (GA 13w6d). ____________________________________________________________________________ Anatomy Scan: 
[facility] gestation. Biometry: BPD 80.8 mm <5th% 32w3d (31w3d to 33w3d) .2 mm <5th% 33w0d (30w0d to 36w0d) .9 mm <5th% 31w4d (30w4d to 32w4d) FL 73.0 mm 79th% 37w3d (34w2d to 40w3d) .1 mm 10th% 32w3d 
EFW (lbs/oz) 4 lbs 14 ozs EFW (g) 2224 g  10th% Fetal heart activity: present. Fetal heart rate: 125 bpm.  
Fetal presentation: cephalic. Placenta: posterior. Fetal Anatomy: 
Visualized with normal appearance: abdominal wall, gastrointestinal tract, kidneys. Summary of Ultrasound Findings: 
Transabdominal US. U/S machine: [employer] E8 Expert. Impression: The fetal size is less than dates suggest. 
 
____________________________________________________________________________ Fetal Wellbeing Assessment: 
Amniotic fluid: normal. DOTTIE: 16.3 cm. MVP: 5.1 cm. Q1: 3.4 cm. Q2: 3.9 cm. Q3: 3.9 cm. Q4: 5.1 cm. Biophysical Profile: Fetal body movements: normal (2), Fetal tone: normal (2), Fetal breathing movements: normal (2), Amniotic fluid volume: normal (2). Score 8 / 8. Summary: Reassuring fetal status. ____________________________________________________________________________ Doppler: 
Fetal Doppler: 
Umbilical Artery: PS 10.2 cm/s  
 ED 21.40 cm/s S/D ratio 2.92   
 RI 0.66   
 PI 1.03   
 TAMX 41.08 cm/s Impression: normal placental bloodflow  (S/D ratio). U/S Machine: [employer] E8 Expert. 
____________________________________________________________________________ Report Summary: 
Impression: This is a remote read: patient is hospitalized for pre-eclampsia evaluation. Had one 170s/110s measurement yesterday that improved without meds. Other BPs are mild range. Labs normal except Upr/cr 0.5. Today EFW is 10th centile, AC is <5th centile, and Dopplers and BPP are reassuring. Findings are consistent with IUGR in the setting of pre-eclampsia. Recommendations: Please procede with delivery.

## 2019-02-07 NOTE — PROGRESS NOTES
Procedure note: 
 
Pt [facility] and her questions were answered. The cervix is 50/closed/-3/vtx. A sterile speculum was placed in the vagina. The cervix was visualized. The [employer]  balloon dilator with stylet was passed through the cervix until the vaginal balloon was in the cervical canal. The uterine balloon was inflated to 40 cc and drawn back to the cervix until the vaginal balloon was visible. The vaginal balloon was inflated to 40 cc. The speculum was removed and digital exam showed the balloons placed properly. The balloons were then inflated to 60 cc each. Pt tolerated the procedure well.

## 2019-02-07 NOTE — PROGRESS NOTES
Per [facility] recommendation will proceed with induction due to growth restriction. Will move to a labor room and place cervical daley for planned induction tomorrow.

## 2019-02-07 NOTE — PROGRESS NOTES
Took over care of patient 1940- Pt c/o lower back pain & a slight headache, requests tylenol 1942- Pt eating dinner 1955- Pt seen coming out of BR, patient states she used bathroom, pt didn't collect urine this time, 24 hour urine restarted at [facility], explained to pt importance of collecting all urine, pt verbalized understanding 2100-pt & belongings transferred to room 213 
 
2323- Pt sleeping 
 
0200- Pt sleeping 
 
0400- pt sleeping

## 2019-02-07 NOTE — PROGRESS NOTES
0715 - Bedside shift change report given to Leann Umaña RN (oncoming nurse) by Bree Romero (offgoing nurse). Report included the following information SBAR, Kardex, Procedure Summary, Intake/Output, MAR, Accordion, Recent Results and Med Rec Status. Patient in bed resting.  
0720 - Plan of care reviewed with patient with  #[id number]. Patient states she feels fine this AM, no headache, burry vision. Patient will continue to measure her urine output and collect for 24hr urine. 0857 - Patient in bed resting.  
 
0920 - Taking patient to [facility] 
1013 - Patient back from [facility], giving patient breakfast tray, will place on monitor for NST after. 1020 - Patient tearful, used  phone and asked patient what was wrong. Patient states, \"the baby wasn't moving much during the appointment and I got scared\". This nurse explained to patient that we will keep monitoring, but the baby sometimes go though sleep cycles and the patient didn't get breakfast until late today, so we will see after breakfast how the baby feels. 1114 - Placing patient on NST, positive fetal movement. 65 - Dr. Khushboo Sanford at bedside discussing care with patient. Per [facility] recommendations, need to induce patient for delivery due to growth restriction. Will do daley bulb later when a labor room opens up. 1320 - Reviewed consents for vaginal delivery with patient with help of . Patient has no questions at this time. Patient wants to take a nap before changing rooms. 1515 - Daley Bulb placed by Dr. Viki Gilmore catheter, 18fr 60/60. Patient tolerated well. VORB to start pit at 4am tomorrow morning. Priscella Retort for patient to come off fetal monitor 2hr after daley bulb insertion if not in labor. Priscella Retort for patient to eat regular diet and NPO after midnight. Priscella Retort for patient to receive stadol and phenergan tonight if patient needs. VORB to finish 24hr urine collection even though we are inducing. 1700 - Patient up to restroom, taking off fetal monitor. Patient having mild contractions every 3-5min. NST reactive 1745 - Patient complaining of pain, placing back on the monitor for NST to give nubain. 1809 - Giving nubain 10mg, Patient rating pain 6/10. Pulled on daley bulb to see if ready to come out, but still in place. 1830 - Patient states the nubain helping with the pain. 1910 - Bedside shift change report given to Choco Munson RN (oncoming nurse) by Coleen Najjar, RN (offgoing nurse). Report included the following information SBAR, Kardex, Procedure Summary, Intake/Output, MAR, Accordion, Recent Results and Med Rec Status.

## 2019-02-08 ENCOUNTER — TELEPHONE (OUTPATIENT)
Dept: OBGYN CLINIC | Age: 34
End: 2019-02-08

## 2019-02-08 ENCOUNTER — ANESTHESIA (OUTPATIENT)
Dept: LABOR AND DELIVERY | Age: 34
DRG: 560 | End: 2019-02-08
Payer: MEDICAID

## 2019-02-08 ENCOUNTER — ANESTHESIA EVENT (OUTPATIENT)
Dept: LABOR AND DELIVERY | Age: 34
DRG: 560 | End: 2019-02-08
Payer: MEDICAID

## 2019-02-08 LAB
HBV SURFACE AG SERPL QL CFM: NORMAL
HIV1 P24 AG SERPL QL IA: NONREACTIVE
HIV1+2 AB SERPL QL IA: NONREACTIVE

## 2019-02-08 PROCEDURE — 74011250637 HC RX REV CODE- 250/637: Performed by: OBSTETRICS & GYNECOLOGY

## 2019-02-08 PROCEDURE — 87341 HEP B SURFACE AG NEUTRLZJ IA: CPT

## 2019-02-08 PROCEDURE — 0UQGXZZ REPAIR VAGINA, EXTERNAL APPROACH: ICD-10-PCS | Performed by: OBSTETRICS & GYNECOLOGY

## 2019-02-08 PROCEDURE — 75410000002 HC LABOR FEE PER 1 HR: Performed by: OBSTETRICS & GYNECOLOGY

## 2019-02-08 PROCEDURE — 77030014125 HC TY EPDRL BBMI -B: Performed by: ANESTHESIOLOGY

## 2019-02-08 PROCEDURE — 76816 OB US FOLLOW-UP PER FETUS: CPT | Performed by: OBSTETRICS & GYNECOLOGY

## 2019-02-08 PROCEDURE — 74011000250 HC RX REV CODE- 250: Performed by: ANESTHESIOLOGY

## 2019-02-08 PROCEDURE — 76819 FETAL BIOPHYS PROFIL W/O NST: CPT | Performed by: OBSTETRICS & GYNECOLOGY

## 2019-02-08 PROCEDURE — 65270000029 HC RM PRIVATE

## 2019-02-08 PROCEDURE — 3E0R3BZ INTRODUCTION OF ANESTHETIC AGENT INTO SPINAL CANAL, PERCUTANEOUS APPROACH: ICD-10-PCS | Performed by: ANESTHESIOLOGY

## 2019-02-08 PROCEDURE — 87340 HEPATITIS B SURFACE AG IA: CPT

## 2019-02-08 PROCEDURE — 00HU33Z INSERTION OF INFUSION DEVICE INTO SPINAL CANAL, PERCUTANEOUS APPROACH: ICD-10-PCS | Performed by: ANESTHESIOLOGY

## 2019-02-08 PROCEDURE — 74011000250 HC RX REV CODE- 250

## 2019-02-08 PROCEDURE — 76820 UMBILICAL ARTERY ECHO: CPT | Performed by: OBSTETRICS & GYNECOLOGY

## 2019-02-08 PROCEDURE — 36415 COLL VENOUS BLD VENIPUNCTURE: CPT

## 2019-02-08 PROCEDURE — 74011250636 HC RX REV CODE- 250/636: Performed by: OBSTETRICS & GYNECOLOGY

## 2019-02-08 PROCEDURE — 86762 RUBELLA ANTIBODY: CPT

## 2019-02-08 PROCEDURE — 77030034850

## 2019-02-08 PROCEDURE — 87389 HIV-1 AG W/HIV-1&-2 AB AG IA: CPT

## 2019-02-08 PROCEDURE — 86592 SYPHILIS TEST NON-TREP QUAL: CPT

## 2019-02-08 RX ORDER — BUPIVACAINE HYDROCHLORIDE 2.5 MG/ML
INJECTION, SOLUTION EPIDURAL; INFILTRATION; INTRACAUDAL AS NEEDED
Status: DISCONTINUED | OUTPATIENT
Start: 2019-02-08 | End: 2019-02-08 | Stop reason: HOSPADM

## 2019-02-08 RX ORDER — NALBUPHINE HYDROCHLORIDE 10 MG/ML
2.5 INJECTION, SOLUTION INTRAMUSCULAR; INTRAVENOUS; SUBCUTANEOUS
Status: ACTIVE | OUTPATIENT
Start: 2019-02-08 | End: 2019-02-08

## 2019-02-08 RX ORDER — HYDROMORPHONE HYDROCHLORIDE 2 MG/ML
1 INJECTION, SOLUTION INTRAMUSCULAR; INTRAVENOUS; SUBCUTANEOUS
Status: DISCONTINUED | OUTPATIENT
Start: 2019-02-08 | End: 2019-02-10 | Stop reason: HOSPADM

## 2019-02-08 RX ORDER — IBUPROFEN 800 MG/1
800 TABLET ORAL
Status: DISCONTINUED | OUTPATIENT
Start: 2019-02-08 | End: 2019-02-10 | Stop reason: HOSPADM

## 2019-02-08 RX ORDER — HYDROCORTISONE ACETATE PRAMOXINE HCL 2.5; 1 G/100G; G/100G
CREAM TOPICAL AS NEEDED
Status: DISCONTINUED | OUTPATIENT
Start: 2019-02-08 | End: 2019-02-10 | Stop reason: HOSPADM

## 2019-02-08 RX ORDER — FENTANYL/BUPIVACAINE/NS/PF 2-1250MCG
1-16 PREFILLED PUMP RESERVOIR EPIDURAL CONTINUOUS
Status: DISCONTINUED | OUTPATIENT
Start: 2019-02-08 | End: 2019-02-08 | Stop reason: HOSPADM

## 2019-02-08 RX ORDER — EPHEDRINE SULFATE 50 MG/ML
10 INJECTION, SOLUTION INTRAVENOUS
Status: DISCONTINUED | OUTPATIENT
Start: 2019-02-08 | End: 2019-02-08 | Stop reason: HOSPADM

## 2019-02-08 RX ORDER — SODIUM CHLORIDE, SODIUM LACTATE, POTASSIUM CHLORIDE, CALCIUM CHLORIDE 600; 310; 30; 20 MG/100ML; MG/100ML; MG/100ML; MG/100ML
125 INJECTION, SOLUTION INTRAVENOUS CONTINUOUS
Status: DISCONTINUED | OUTPATIENT
Start: 2019-02-08 | End: 2019-02-08

## 2019-02-08 RX ORDER — DIPHENHYDRAMINE HCL 25 MG
25 CAPSULE ORAL
Status: DISCONTINUED | OUTPATIENT
Start: 2019-02-08 | End: 2019-02-10 | Stop reason: HOSPADM

## 2019-02-08 RX ORDER — ONDANSETRON 2 MG/ML
4 INJECTION INTRAMUSCULAR; INTRAVENOUS
Status: DISCONTINUED | OUTPATIENT
Start: 2019-02-08 | End: 2019-02-10 | Stop reason: HOSPADM

## 2019-02-08 RX ORDER — ZOLPIDEM TARTRATE 5 MG/1
5 TABLET ORAL
Status: DISCONTINUED | OUTPATIENT
Start: 2019-02-08 | End: 2019-02-10 | Stop reason: HOSPADM

## 2019-02-08 RX ORDER — OXYTOCIN/RINGER'S LACTATE 20/1000 ML
125-500 PLASTIC BAG, INJECTION (ML) INTRAVENOUS AS NEEDED
Status: DISCONTINUED | OUTPATIENT
Start: 2019-02-08 | End: 2019-02-10 | Stop reason: HOSPADM

## 2019-02-08 RX ORDER — SIMETHICONE 80 MG
80 TABLET,CHEWABLE ORAL
Status: DISCONTINUED | OUTPATIENT
Start: 2019-02-08 | End: 2019-02-10 | Stop reason: HOSPADM

## 2019-02-08 RX ORDER — DOCUSATE SODIUM 100 MG/1
100 CAPSULE, LIQUID FILLED ORAL
Status: DISCONTINUED | OUTPATIENT
Start: 2019-02-08 | End: 2019-02-10 | Stop reason: HOSPADM

## 2019-02-08 RX ORDER — IBUPROFEN 600 MG/1
600 TABLET ORAL
Qty: 30 TAB | Refills: 1 | Status: SHIPPED | OUTPATIENT
Start: 2019-02-08

## 2019-02-08 RX ORDER — LIDOCAINE HYDROCHLORIDE AND EPINEPHRINE 15; 5 MG/ML; UG/ML
INJECTION, SOLUTION EPIDURAL
Status: SHIPPED | OUTPATIENT
Start: 2019-02-08 | End: 2019-02-08

## 2019-02-08 RX ORDER — ACETAMINOPHEN 325 MG/1
650 TABLET ORAL
Status: DISCONTINUED | OUTPATIENT
Start: 2019-02-08 | End: 2019-02-10 | Stop reason: HOSPADM

## 2019-02-08 RX ORDER — SODIUM CHLORIDE 0.9 % (FLUSH) 0.9 %
5-40 SYRINGE (ML) INJECTION AS NEEDED
Status: DISCONTINUED | OUTPATIENT
Start: 2019-02-08 | End: 2019-02-10 | Stop reason: HOSPADM

## 2019-02-08 RX ORDER — HYDROCODONE BITARTRATE AND ACETAMINOPHEN 5; 325 MG/1; MG/1
1 TABLET ORAL
Qty: 10 TAB | Refills: 0 | Status: SHIPPED | OUTPATIENT
Start: 2019-02-08 | End: 2020-04-29

## 2019-02-08 RX ORDER — SODIUM CHLORIDE 0.9 % (FLUSH) 0.9 %
5-40 SYRINGE (ML) INJECTION EVERY 8 HOURS
Status: DISCONTINUED | OUTPATIENT
Start: 2019-02-08 | End: 2019-02-10 | Stop reason: HOSPADM

## 2019-02-08 RX ORDER — NALOXONE HYDROCHLORIDE 0.4 MG/ML
0.4 INJECTION, SOLUTION INTRAMUSCULAR; INTRAVENOUS; SUBCUTANEOUS AS NEEDED
Status: DISCONTINUED | OUTPATIENT
Start: 2019-02-08 | End: 2019-02-10 | Stop reason: HOSPADM

## 2019-02-08 RX ORDER — HYDROCODONE BITARTRATE AND ACETAMINOPHEN 7.5; 325 MG/1; MG/1
1 TABLET ORAL
Status: DISCONTINUED | OUTPATIENT
Start: 2019-02-08 | End: 2019-02-10 | Stop reason: HOSPADM

## 2019-02-08 RX ADMIN — IBUPROFEN 800 MG: 800 TABLET ORAL at 21:23

## 2019-02-08 RX ADMIN — BUPIVACAINE HYDROCHLORIDE 6 ML: 2.5 INJECTION, SOLUTION EPIDURAL; INFILTRATION; INTRACAUDAL at 06:27

## 2019-02-08 RX ADMIN — IBUPROFEN 800 MG: 800 TABLET ORAL at 14:10

## 2019-02-08 RX ADMIN — OXYTOCIN-SODIUM CHLORIDE 0.9% IV SOLN 30 UNIT/500ML 6 MILLI-UNITS/MIN: 30-0.9/5 SOLUTION at 05:18

## 2019-02-08 RX ADMIN — Medication 12 ML/HR: at 10:30

## 2019-02-08 RX ADMIN — SODIUM CHLORIDE, SODIUM LACTATE, POTASSIUM CHLORIDE, AND CALCIUM CHLORIDE 125 ML/HR: 600; 310; 30; 20 INJECTION, SOLUTION INTRAVENOUS at 08:45

## 2019-02-08 RX ADMIN — Medication 10 ML: at 03:35

## 2019-02-08 RX ADMIN — OXYTOCIN-SODIUM CHLORIDE 0.9% IV SOLN 30 UNIT/500ML 2 MILLI-UNITS/MIN: 30-0.9/5 SOLUTION at 03:59

## 2019-02-08 RX ADMIN — HYDROCODONE BITARTRATE AND ACETAMINOPHEN 1 TABLET: 7.5; 325 TABLET ORAL at 22:11

## 2019-02-08 RX ADMIN — ONDANSETRON 4 MG: 2 INJECTION INTRAMUSCULAR; INTRAVENOUS at 06:25

## 2019-02-08 RX ADMIN — LIDOCAINE HYDROCHLORIDE AND EPINEPHRINE 4 ML: 15; 5 INJECTION, SOLUTION EPIDURAL at 06:36

## 2019-02-08 RX ADMIN — Medication 12 ML/HR: at 06:51

## 2019-02-08 RX ADMIN — SODIUM CHLORIDE, SODIUM LACTATE, POTASSIUM CHLORIDE, AND CALCIUM CHLORIDE 125 ML/HR: 600; 310; 30; 20 INJECTION, SOLUTION INTRAVENOUS at 03:35

## 2019-02-08 NOTE — PROGRESS NOTES
Delivery note: 
 
Delivery Note Patient delivered by . 4 pounds 9 ounces. Apgars 8 @ one minute and 9 @ five minutes. no episiotomy, small vaginal tear, repaired with 3-0 vicryl with one figure a 8 suture. Third stage normal. Placenta delivered spontaneously. EBL 300cc. Uterus and vagina clear of sponges. PTBLFI. A suspected partial abruption was noted

## 2019-02-08 NOTE — DISCHARGE SUMMARY
Obstetrical Discharge Summary     Name: Therese Butler MRN: [id number]  SSN: [id number]    YOB: 1985  Age: 28 y.o. Sex: female      Admit Date: 2019    Discharge Date: 2/10/2019     Admitting Physician: Guido Morales MD     Attending Physician:  Sherley Mercedes MD     Admission Diagnoses: Preeclampsia, third trimester [O14.93]; Pregnancy [Z34.90]    Discharge Diagnoses:   Information for the patient's :  Clarisa Gordillo, Female Guzman Navin [[id number]]   Delivery of a 4 lb 9.6 oz (2.085 kg) female infant via Vaginal, Spontaneous on 2019 at 10:51 AM  by . Apgars were  and . Additional Diagnoses:   Hospital Problems  Date Reviewed: 2019          Codes Class Noted POA    Preeclampsia, third trimester ICD-10-CM: O14.93  ICD-9-CM: 642.43  2019 Unknown        Pregnancy ICD-10-CM: Z34.90  ICD-9-CM: V22.2  2018 Unknown    Overview Addendum 2019  1:57 PM by Sherley Mercedes MD     Use US for [facility]  ? H/O of PIH in previous pregnancy  Does not speak English  Panorama-Female, low risk  AFP normal  Flu vaccine 10/17/2018  TDAP 2019  GBS negative                    Lab Results   Component Value Date/Time    GrBStrep, External negative 2019       Hospital Course: Normal hospital course following the delivery. Condition on discharge: stable  Disposition: Home  Patient Instructions:   Current Discharge Medication List      START taking these medications    Details   ibuprofen (MOTRIN) 600 mg tablet Take 1 Tab by mouth every six (6) hours as needed for Pain. Qty: 30 Tab, Refills: 1      HYDROcodone-acetaminophen (NORCO) 5-325 mg per tablet Take 1 Tab by mouth every six (6) hours as needed for Pain. Max Daily Amount: 4 Tabs. Qty: 10 Tab, Refills: 0    Associated Diagnoses: Postpartum pain         CONTINUE these medications which have NOT CHANGED    Details   acetaminophen (TYLENOL) 325 mg tablet Take 650 mg by mouth every four (4) hours as needed for Pain.       PNV No12-Iron-FA-DSS-OM-3 29 mg iron-1 mg -50 mg CPKD Take 1 Tab by mouth daily. albuterol (PROVENTIL HFA, VENTOLIN HFA, PROAIR HFA) 90 mcg/actuation inhaler Take 2 Puffs by inhalation every four (4) hours as needed for Wheezing. To send to [facility] once she is qualified  Qty: 1 Inhaler, Refills: 11    Associated Diagnoses: Moderate persistent asthma without complication      ondansetron hcl (ZOFRAN) 4 mg tablet Take 4 mg by mouth every eight (8) hours as needed for Nausea.      loratadine (CLARITIN) 10 mg tablet Take 1 Tab by mouth daily. [place] 1 pastilla cada sahra  Qty: 90 Tab, Refills: 3    Associated Diagnoses: Moderate persistent asthma without complication      montelukast (SINGULAIR) 10 mg tablet Take 1 Tab by mouth daily. [place] 1 pastilla cada sahra  Qty: 90 Tab, Refills: 1    Associated Diagnoses: Moderate persistent asthma without complication         STOP taking these medications       oseltamivir (TAMIFLU) 75 mg capsule Comments:   Reason for Stopping:               Reference my discharge instructions.     Follow-up Appointments   Procedures    FOLLOW UP VISIT Appointment in: 6 Weeks     Standing Status:   Standing     Number of Occurrences:   1     Order Specific Question:   Appointment in     Answer:   6 Weeks        Signed By:  Dakota Chong MD     February 8, 2019

## 2019-02-08 NOTE — ANESTHESIA PROCEDURE NOTES
Epidural Block Start time: 2/8/2019 6:22 AM 
End time: 2/8/2019 6:42 AM 
Performed by: Javier Collazo MD 
Authorized by: Javier Collazo MD  
 
Pre-Procedure Preanesthetic Checklist: patient identified, risks and benefits discussed, anesthesia consent, site marked, patient being monitored, timeout performed and anesthesia consent Timeout Time: 06:26 Epidural:  
Patient position:  Seated Prep region:  Lumbar Prep: Patient draped and Chlorhexidine Location:  L2-3 Needle and Epidural Catheter:  
Needle Type:  Tuohy Needle Gauge:  17 G Attempts:  1 Catheter Size:  20 G Catheter at Skin Depth (cm):  10 Depth in Epidural Space (cm):  5 Events: no blood with aspiration, no cerebrospinal fluid with aspiration, no paresthesia and negative aspiration test   
Test Dose:  Negative Assessment:  
Catheter Secured:  Tegaderm and tape Insertion:  Uncomplicated Patient tolerance:  Patient tolerated the procedure well with no immediate complications

## 2019-02-08 NOTE — LACTATION NOTE
Problem: Lactation Care Plan Goal: *Infant latching appropriately Outcome: Progressing Towards Goal 
Mom relaxed and comfortable with breast feeding. Shown how to hand express, and many drops noted.   
  
Pt will successfully establish breastfeeding by feeding in response to early feeding cues  
or wake every 3h, will obtain deep latch, and will keep log of feedings/output. Taught to BF at hunger cues and or q 2-3 hrs and to offer 10-20 drops of hand expressed colostrum at any non-feeds.   
  
Breast Assessment Left Breast: Large Left Nipple: Everted, Large, Intact Right Breast: Large Right Nipple: Everted, Intact Breast- Feeding Assessment Attends Breast-Feeding Classes: No 
Breast-Feeding Experience: Yes(6 months with first baby) Breast Trauma/Surgery: No 
Type/Quality: Good Lactation Consultant Visits Breast-Feedings: Good Mother/Infant Observation Mother Observation: Alignment, Breast comfortable, Close hold, Holds breast, Nipple round on release, Lets baby end feeding, Recognizes feeding cues Infant Observation: Breast tissue moves, Opens mouth, Frenulum checked, Lips flanged, upper, Lips flanged, lower, Latches nipple and aereolae LATCH Documentation Latch: Repeated attempts, hold nipple in mouth, stimulate to suck Audible Swallowing: A few with stimulation Type of Nipple: Everted (after stimulation) Comfort (Breast/Nipple): Soft/non-tender Hold (Positioning): Full assist, teach one side, mother does other, staff holds LATCH Score: 7 
  
  
Goal: *Weight loss less than 10% of birth weight Outcome: Progressing Towards Goal 
  
Encouraged mom to attempt feeding with baby led feeding cues. Just as sucking on fingers, rooting, mouthing. Looking for 8-12 feedings in 24 hours. Don't limit baby at breast, allow baby to come of breast on it's own. Baby may want to feed  often and may increase number of feedings on second day of life.  Skin to skin encouraged.  
  
 If baby doesn't nurse,  Mom should  hand express  10-20 drops of colostrum and drip into baby's mouth, or give to baby by finger feeding, cup feeding, or spoon feeding at least every 2-3 hours.  
  
  
Problem: Patient Education: Go to Patient Education Activity Goal: Patient/Family Education Outcome: Progressing Towards Goal 
Reviewed breastfeeding basics:  Supply and demand,  stomach size, early  Feeding cues, skin to skin, positioning and baby led latch-on, assymetrical latch with signs of good, deep latch vs shallow, feeding frequency and duration, and log sheet for tracking infant feedings and output. Breastfeeding Booklet and Warm line information given. Discussed typical  weight loss and the importance of infant weight checks with pediatrician 1-2 post discharge. 
  
Hand Expression Education:  Mom taught how to manually hand express her colostrum. Emphasized the importance of providing infant with valuable colostrum as infant rests skin to skin at breast.  Aware to avoid extended periods of non-feeding. Aware to offer 10-20+ drops of colostrum every 2-3 hours until infant is latching and nursing effectively. Taught the rationale behind this low tech but highly effective evidence based practice. 
  
Discussed with mother her plan for feeding. Reviewed the benefits of exclusive breast milk feeding during the hospital stay. Informed her of the risks of using formula to supplement in the first few days of life as well as the benefits of successful breast milk feeding; referred her to the Breastfeeding booklet about this information. She acknowledges understanding of information reviewed and states that it is her plan to breast feed her infant.   Will support her choice and offer additional information as needed.  
  
Information given in Yoruba.

## 2019-02-08 NOTE — TELEPHONE ENCOUNTER
Call received at 659PM    28year old  36w1d pregnant patient in labor and delivery. Labor and delivery nurse calling regarding labs that she can not find in the chart. This nurse also looked and can not find. Dr. Zaria Cazares advised and will check the chart and contact labor and delivery.

## 2019-02-08 NOTE — ANESTHESIA PREPROCEDURE EVALUATION
Anesthetic History No history of anesthetic complications Review of Systems / Medical History Patient summary reviewed Pulmonary Asthma : well controlled Neuro/Psych Within defined limits Cardiovascular Hypertension Exercise tolerance: >4 METS 
  
GI/Hepatic/Renal 
Within defined limits Endo/Other Within defined limits Other Findings Physical Exam 
 
Airway Mallampati: II 
 
Neck ROM: normal range of motion Mouth opening: Normal 
 
 Cardiovascular Rhythm: regular Rate: normal 
 
 
 
 Dental 
No notable dental hx Pulmonary Breath sounds clear to auscultation Abdominal 
GI exam deferred Other Findings Anesthetic Plan ASA: 2 Anesthesia type: epidural 
 
 
 
 
 
Anesthetic plan and risks discussed with: Patient

## 2019-02-08 NOTE — PROGRESS NOTES
02/08/19 2:06 PM 
CM met with SIN and her /FOB Jose Roberto Aldrich ([phone]) to complete initial assessment and to begin discharge planning. Demographics were reviewed and confirmed; SIN and FOB live together in [place] with SIN's 6year old son. SIN does not work nor does she drive. FOB works and will return to work on Monday. Supports include SIN's friend Jayden Alex ([phone]) and a few family members. Patient has car seat, crib, clothing, and other necessary supplies. Dr. Viktoriya Mendoza at [facility] will provide medical follow up for the baby; family encouraged to call today and make an appointment for the baby for Monday. SIN is breastfeeding. [employer] rep aware that MOB and baby both need Medicaid screening and she will complete today. SIN has already applied for the Care Card and the decision is pending. SIN utilizes [facility] through [place] and already has an appointment scheduled to enroll the baby. Parents denied any additional needs at this time. Care Management Interventions PCP Verified by CM: Amol Ayoub) Mode of Transport at Discharge: Self Transition of Care Consult (CM Consult): Discharge Planning Current Support Network: Lives with Spouse, Family Lives [place] Confirm Follow Up Transport: Friends Plan discussed with Pt/Family/Caregiver: Yes Discharge Location Discharge Placement: Home with family assistance NAHOMI Shaw

## 2019-02-08 NOTE — PROGRESS NOTES
1924: Pt resting quietly with family at bedside. States good relief from nubain. Denies needs at this time. 8:37 PM 
Resting quietly w/o complaint. 24 hr urine collected and sent to lab @ 1955. 
 
11:37 PM 
Discussion with patient through  about pain mgmt options during the night and tomorrow. Pt states she is having \"small\" contractions and would like to rest.  Elects to have ambien at this time and can re evaluate later if additional pain meds required. Epidural discussed with patient and she would like to have one when it's time.

## 2019-02-08 NOTE — PROGRESS NOTES
SBAR report received from WALLY López RN, care of pt assumed at this time. 0050 Pt sleeping, respirations unlabored. 0710 Bedside and Verbal shift change report given to AUTUMN Katz RN by Grayson Bustillo. Report included the following information SBAR, Intake/Output, MAR and Recent Results.

## 2019-02-09 LAB
HBV SURFACE AG SER QL: 0.79 INDEX
HBV SURFACE AG SER QL: NEGATIVE

## 2019-02-09 PROCEDURE — 65270000029 HC RM PRIVATE

## 2019-02-09 PROCEDURE — 74011250637 HC RX REV CODE- 250/637: Performed by: OBSTETRICS & GYNECOLOGY

## 2019-02-09 PROCEDURE — 77030018846 HC SOL IRR STRL H20 ICUM -A

## 2019-02-09 RX ADMIN — IBUPROFEN 800 MG: 800 TABLET ORAL at 12:18

## 2019-02-09 RX ADMIN — HYDROCODONE BITARTRATE AND ACETAMINOPHEN 1 TABLET: 7.5; 325 TABLET ORAL at 16:38

## 2019-02-09 NOTE — PROGRESS NOTES
Post-Partum Day Number 1 Progress Note 100[facility] Information for the patient's :  Edna Shown, Female Tutu Rollins [[id number]] Vaginal, Spontaneous Patient doing well without significant complaint. Voiding without difficulty, normal lochia. Vitals: 
Visit Vitals /87 (BP 1 Location: Left arm, BP Patient Position: At rest) Pulse 69 Temp 98.1 °F (36.7 °C) Resp 16 LMP 2018 SpO2 94% Breastfeeding? No  
 
Temp (24hrs), Av.4 °F (36.9 °C), Min:97.7 °F (36.5 °C), Max:99.4 °F (37.4 °C) Exam:   Patient without distress. Abdomen soft, fundus firm, nontender Perineum with normal lochia noted. Lower extremities are negative for swelling, cords or tenderness. Labs:  
 
Lab Results Component Value Date/Time WBC 11.9 (H) 2019 03:25 PM  
 WBC 12.3 (H) 01/10/2019 09:20 AM  
 WBC 12.7 (H) 2019 08:11 AM  
 WBC 12.0 (H) 2018 12:43 PM  
 HGB 12.3 2019 03:25 PM  
 HGB 12.0 01/10/2019 09:20 AM  
 HGB 12.6 2019 08:11 AM  
 HGB 11.8 2018 12:43 PM  
 HCT 39.0 2019 03:25 PM  
 HCT 36.4 01/10/2019 09:20 AM  
 HCT 38.7 2019 08:11 AM  
 HCT 35.8 2018 12:43 PM  
 PLATELET 373 82/10/0465 03:25 PM  
 PLATELET 342  09:20 AM  
 PLATELET 676  08:11 AM  
 PLATELET 398  12:43 PM  
 
 
Recent Results (from the past 24 hour(s)) HEP B SURFACE AG Collection Time: 19 12:55 PM  
Result Value Ref Range Hep B surface Ag Interp. (A) NEG Due to repeatedly low index value, reflexed to confirmatory testing by neutralization. HIV-1,2 P24 AG/AB SCREEN Collection Time: 19 12:55 PM  
Result Value Ref Range p24 Antigen NONREACTIVE NR    
 HIV-1,2 Ab NONREACTIVE NR    
HEP B SURF AG CONFIR Collection Time: 19 12:55 PM  
Result Value Ref Range HBs Ag confirmation PENDING Assessment: Doing well, post partum day 1 
 
 Plan: 1. Continue routine postpartum and perineal care as well as maternal education.

## 2019-02-09 NOTE — ROUTINE PROCESS
Bedside and Verbal shift change report given to Romero Mora RN (oncoming nurse) by SN Steve / Alejandro Villegas RN (offgoing nurse). Report included the following information SBAR.

## 2019-02-09 NOTE — LACTATION NOTE
This note was copied from a baby's chart. Mother skin to skin with baby, baby asleep between mothers breasts. Mother states BF is going well and denies questions or needs. Mother c/o lower back pain at epidural site. Fatou Herron RN aware of pts c/o pain and request for pain med. Reviewed breastfeeding techniques and positions with mother until found a position she was most comfortable with. Reminded mother of early feeding cues and that breast fed infants should be fed on demand without time restriction on the first breast until the infant seems satisfied. Then the second breast is offered. Advised mother to awaken  to feed if three hours have passed since baby last ate. Will continue to monitor mother's progress with breastfeeding and offer assistance at any time. Pt will successfully establish breastfeeding by feeding in response to early feeding cues  
or wake every 3h, will obtain deep latch, and will keep log of feedings/output. Taught to BF at hunger cues and or q 2-3 hrs and to offer 10-20 drops of hand expressed colostrum at any non-feeds. Breast Assessment Left Breast: Medium Left Nipple: Everted, Intact Right Breast: Medium Right Nipple: Everted, Intact Breast- Feeding Assessment Attends Breast-Feeding Classes: No 
Breast-Feeding Experience: Yes(6 months with first baby) Breast Trauma/Surgery: No 
Type/Quality: Good Lactation Consultant Visits Breast-Feedings: Good (per mother) Mother/Infant Observation Mother Observation: Breast comfortable, Recognizes feeding cues Infant Observation: Breast tissue moves, Opens mouth, Frenulum checked, Lips flanged, upper, Lips flanged, lower, Latches nipple and aereolae

## 2019-02-10 VITALS
TEMPERATURE: 98.7 F | HEART RATE: 85 BPM | OXYGEN SATURATION: 94 % | DIASTOLIC BLOOD PRESSURE: 90 MMHG | RESPIRATION RATE: 18 BRPM | SYSTOLIC BLOOD PRESSURE: 137 MMHG

## 2019-02-10 LAB
RUBV IGG SER-IMP: REACTIVE
RUBV IGG SERPL IA-ACNC: >500 IU/ML

## 2019-02-10 NOTE — PROGRESS NOTES
Bedside and Verbal shift change report given to Jessika Vázquez RN (oncoming nurse) by Ilana Valentin RN (offgoing nurse). Report included the following information SBAR, Intake/Output, MAR and Recent Results.

## 2019-02-10 NOTE — ROUTINE PROCESS
Reviewed discharge instructions with patient, with assistance from her son to translate, she verbalized understanding. Documents signed and prescriptions given. Follow up with OB in 6 weeks.

## 2019-02-10 NOTE — PROGRESS NOTES
Post-Partum Day Number 2 Progress Note 1008 [facility] Information for the patient's :  Joselin Munguia, Female Sukumar Aid [[id number]] Vaginal, Spontaneous Patient doing well without significant complaint. Voiding without difficulty, normal lochia. Vitals: 
Visit Vitals /90 (BP 1 Location: Right arm, BP Patient Position: At rest) Pulse 85 Temp 98.7 °F (37.1 °C) Resp 18 LMP 2018 SpO2 94% Breastfeeding? No  
 
Temp (24hrs), Av.5 °F (36.9 °C), Min:98.3 °F (36.8 °C), Max:98.7 °F (37.1 °C) Exam:    
    Patient without distress. Abdomen soft, fundus firm, nontender 
               ower extremities are negative for swelling, cords or tenderness. Labs:  
 
Lab Results Component Value Date/Time WBC 11.9 (H) 2019 03:25 PM  
 WBC 12.3 (H) 01/10/2019 09:20 AM  
 WBC 12.7 (H) 2019 08:11 AM  
 WBC 12.0 (H) 2018 12:43 PM  
 HGB 12.3 2019 03:25 PM  
 HGB 12.0 01/10/2019 09:20 AM  
 HGB 12.6 2019 08:11 AM  
 HGB 11.8 2018 12:43 PM  
 HCT 39.0 2019 03:25 PM  
 HCT 36.4 01/10/2019 09:20 AM  
 HCT 38.7 2019 08:11 AM  
 HCT 35.8 2018 12:43 PM  
 PLATELET 176 3749 03:25 PM  
 PLATELET 693  09:20 AM  
 PLATELET 333  08:11 AM  
 PLATELET 352  12:43 PM  
 
 
No results found for this or any previous visit (from the past 24 hour(s)). Assessment: Doing well, post partum day 2 Plan: 1. Discharge home today 2. Follow up in office in 6 weeks with Ilana Yu MD 
3. Post partum activity advised, diet as tolerated

## 2019-02-10 NOTE — DISCHARGE INSTRUCTIONS
[address] [facility]  Name: Hossein Estrada  YOB: 1985      General:     Diet/Diet Restrictions:  Try to drink eight 8-ounce glasses of fluid daily (water, juices); avoid excessive caffeine intake. Meals/snacks as desired which are high in fiber and carbohydrates and low in fat and cholesterol. Medications:       Physical Activity / Restrictions / Safety:     Avoid heavy lifting, no more that 10 lbs. for 2-3 weeks; No driving while taking narcotic pain medication. Post  patients should not drive until pain free. No intercourse for 4-6 weeks if you had a vaginal delivery and 6 weeks if you had a  Section. Always use contraception even if you are breastfeeding. No douching or tampon use. May resume exercise in 6 weeks. Discharge Instructions/Special Treatment/Home Care Needs:     Continue prenatal vitamins. Continue to use squirt bottle with warm water on your episiotomy after each bathroom use until bleeding stops. If you had a  Section and if steri-strips were applied to your incision, remove them in 7 days. If they fall off before then it's okay. If you are sent home with staples still on the incision then call the office to make an appointment within the next week to remove them. Take stool softeners (Colace) daily when you first get home and then as needed for constipation. Take them for 3-4 weeks if you had a large vaginal tear. Call your doctor for the following:     Fever over 101 degrees by mouth. Vaginal bleeding heavier than a normal menstrual period or clots larger than a golf ball. Red streaks or increased swelling of legs, painful red streaks on your breast.  Painful urination, or increased pain, redness or discharge with your incision. Pain Management:     Pain Management:   Take Acetaminophen (Tylenol) or Ibuprofen (Advil, Motrin) as directed for pain.   Take perscribed narcotic meds if over the counter meds are not adequately controlling your pain. Use a warm Sitz bath 3 times daily to relieve episiotomy or hemorrhoidal discomfort. A heating pad applied to your lower abdomen can help relieve  incision pain as well as uterine cramps from delivery. For hemorrhoidal discomfort use Tucks pads and Anusol cream/Preparation H as needed and directed. Follow-Up Care:     Unless instructed otherwise, make an appointment for 6 weeks after delivery.   Telephone number: [phone]    Signed By: Timo Boyd MD                                                                                                   Date: 2019 Time: 4:12 PM

## 2019-02-11 LAB — RPR SER QL: NONREACTIVE

## 2020-04-29 ENCOUNTER — OFFICE VISIT (OUTPATIENT)
Dept: FAMILY MEDICINE CLINIC | Age: 35
End: 2020-04-29

## 2020-04-29 ENCOUNTER — TELEPHONE (OUTPATIENT)
Dept: FAMILY MEDICINE CLINIC | Age: 35
End: 2020-04-29

## 2020-04-29 VITALS — WEIGHT: 200 LBS | BODY MASS INDEX: 33.28 KG/M2

## 2020-04-29 DIAGNOSIS — G47.00 INSOMNIA, UNSPECIFIED TYPE: ICD-10-CM

## 2020-04-29 DIAGNOSIS — R10.2 PELVIC PAIN: Primary | ICD-10-CM

## 2020-04-29 DIAGNOSIS — D25.9 UTERINE LEIOMYOMA, UNSPECIFIED LOCATION: ICD-10-CM

## 2020-04-29 DIAGNOSIS — F43.29 STRESS AND ADJUSTMENT REACTION: ICD-10-CM

## 2020-04-29 RX ORDER — HYDROXYZINE PAMOATE 25 MG/1
25 CAPSULE ORAL
Qty: 15 CAP | Refills: 1 | Status: SHIPPED | OUTPATIENT
Start: 2020-04-29 | End: 2020-07-14

## 2020-04-29 NOTE — PROGRESS NOTES
Attempted to call patient twice to review discharge instructions, AN referral, behavioral health referral, gyn referral and need to fill out record release form for recent ER discharge; no answer. With assistance of [facility]  left  informing the patient about her prescription and how to use the Goodrx coupon, Goodrx coupon texted to her phone. Pt asked to call the office back to review discharge plan. Veronique messaged regarding AN referral, Joshua Oliva regarding [address]. Fabiana messaged to mail record release form for recent ER visit. Asael Sands, [facility]

## 2020-04-29 NOTE — TELEPHONE ENCOUNTER
Called pt for wellness check. Patient stated she went to [facility] and she will call the [facility] to make an appointment for follow up b/c she needs to see a Dr. The appointment line number and Covid-19 information were given.  Patient has a new address:  [address] [place], [address] [facility]  Phone call was routed to front office to update address

## 2020-04-29 NOTE — PROGRESS NOTES
Assessment/Plan:    Diagnoses and all orders for this visit:    1. Pelvic pain  -     REFERRAL TO GYNECOLOGY    2. Uterine leiomyoma, unspecified location  -     REFERRAL TO GYNECOLOGY    3. Stress and adjustment reaction  -     hydrOXYzine pamoate (VISTARIL) 25 mg capsule; Take 1 Cap by mouth nightly as needed for Sleep.  -     REFERRAL TO BEHAVIORAL HEALTH    4. Insomnia, unspecified type  -     hydrOXYzine pamoate (VISTARIL) 25 mg capsule; Take 1 Cap by mouth nightly as needed for Sleep. Close f/up in 2 weeks  Pt is not suicidal and would like to have counseling intervention  Not at risk for food insecurity at this time and no risk of DV  Record release for ER visit please - she was referred to gyn 2 days after the ER visit but the cost was $450 for the visit so she had to cancel. Follow-up and Dispositions    · Return in about 2 weeks (around 2020). ANNEMARIE Box expressed understanding of this plan. An AVS was printed and given to the patient.      ----------------------------------------------------------------------    Chief Complaint   Patient presents with   [address] [place] [address] doctors on 4/15 for abdominal pain and back pain +SOB. Still in pain taking tylenol for pain relief. x6 months with no menstrual cycle negative pregnancy test. Hx of cysts    Anxiety     please check depression screening       History of Present Illness:  Pt called and consented to video visit, doxy me used  She was identified by name and   She is being seen for ER f/up from about 10 days ago  She presented to the ER for lower abdominal/ pelvic pain. She had an IV with meds and had a CT scan. She was referred to GI (which she did go see it sounds like although she was not able to tell me who she saw) and gyn (which she could not afford)  She was rx'd ibu 800 mg, pepcid and phenergan.  The medications do not seem to be working as she continues to have sxs  She has a 3 yo and 15 yo at home with her, she is sheltering in place with her children. Her  has been able to continue to work. There is no DV  She is not breast feeding, her 3 yo was born prematurely due to pre eclampsia and is doing \"great, she is so chubby! \"  The pt had a depo in 11/19 even though she was concerned about using this medication, and she bled for 20 days then has not had a period since then but has seen blood tinged vaginal discharge in the past few days. The ER visit had neg pregnancy test before her ct scan   She has a hx of myomas and wonders if this is what is causing the pain  She has some discomfort with intercourse  She had answered affirmatively to the depression screening and I asked her to tell me about this. She immediately started to cry and said that she is not sleeping at night - for about 20 days now. It seems to correlate with her pain to some degree but she states that she feels very anxious and lays awake due to this. She has no suicidal thoughts. She states it is very hard to be home and not working, with her children at home too. She is agreeable to counseling and medication and close f/up       Past Medical History:   Diagnosis Date    Asthma     uses inhaler-4 months ago    Gestational hypertension     both pregnancy (pre-eclampsia with first; on Mg postpartum)    Hypertension        Current Outpatient Medications   Medication Sig Dispense Refill    hydrOXYzine pamoate (VISTARIL) 25 mg capsule Take 1 Cap by mouth nightly as needed for Sleep. 15 Cap 1    loratadine (CLARITIN) 10 mg tablet Take 1 Tab by mouth daily. [place] 1 pastilla cada sahra 90 Tab 3    albuterol (PROVENTIL HFA, VENTOLIN HFA, PROAIR HFA) 90 mcg/actuation inhaler Take 2 Puffs by inhalation every four (4) hours as needed for Wheezing. To send to [facility] once she is qualified 1 Inhaler 11    ibuprofen (MOTRIN) 600 mg tablet Take 1 Tab by mouth every six (6) hours as needed for Pain.  30 Tab 1    PNV No12-Iron-FA-DSS-OM-3 29 mg iron-1 mg -50 mg CPKD Take 1 Tab by mouth daily.  montelukast (SINGULAIR) 10 mg tablet Take 1 Tab by mouth daily.  [place] 1 pastilla cada sahra 90 Tab 1       No Known Allergies    Social History     Tobacco Use    Smoking status: Never Smoker    Smokeless tobacco: Never Used   Substance Use Topics    Alcohol use: No    Drug use: No       Family History   Problem Relation Age of Onset    No Known Problems Mother     No Known Problems Father        Physical Exam:     Visit Vitals  Wt 200 lb (90.7 kg)   LMP  (LMP Unknown)   BMI 33.28 kg/m²       A&Ox3  WDWN NAD  Respirations normal and non labored

## 2020-04-29 NOTE — Clinical Note
jodie- can you discuss access now referral with her, [facility]- can you schedule her for 2 weeks with me video f/up  Julia/joselo: can you see her?  Acute anxiety

## 2020-04-29 NOTE — PATIENT INSTRUCTIONS
La linea directa para COVID preguntas o sintomas:     [phone]    El ledy para hacer andrea elsy con la Michelle Boor:     [phone]

## 2020-04-30 ENCOUNTER — DOCUMENTATION ONLY (OUTPATIENT)
Dept: FAMILY MEDICINE CLINIC | Age: 35
End: 2020-04-30

## 2020-04-30 ENCOUNTER — OFFICE VISIT (OUTPATIENT)
Dept: FAMILY MEDICINE CLINIC | Age: 35
End: 2020-04-30

## 2020-04-30 ENCOUNTER — TELEPHONE (OUTPATIENT)
Dept: FAMILY MEDICINE CLINIC | Age: 35
End: 2020-04-30

## 2020-04-30 DIAGNOSIS — Z71.89 COUNSELING AND COORDINATION OF CARE: Primary | ICD-10-CM

## 2020-04-30 NOTE — TELEPHONE ENCOUNTER
P/C to [facility] to scheduled initial behavioral health appointment. Patient gave verbal consent to schedule the virtual visit and to send the appointment information via text and e-mail. The appointment was scheduled for Tuesday, May 12, 2020 01:00 PM. The appointment information was sent via text/e-mail. During the call the patient inquired about an  appointment that was scheduled for today, April 30th at 10:30 AM but she has not received a call. I provided the [phone] phone number so the patient can reach out to inquire about this appointment. I am also routing this call to ISABEL Lancaster who she had the appointment scheduled with. Patient verbalized understanding and has no further questions.     P/C routed to [address] [place] [address], Venkat Justice LCSW & Chava Perez, [address] [facility]

## 2020-04-30 NOTE — PROGRESS NOTES
The Authorization to release medical records was mailed to the pt with a stamped self addressed envelope included.  Missy Parks RN

## 2020-05-01 ENCOUNTER — OFFICE VISIT (OUTPATIENT)
Dept: FAMILY MEDICINE CLINIC | Age: 35
End: 2020-05-01

## 2020-05-01 DIAGNOSIS — Z71.89 COUNSELING AND COORDINATION OF CARE: Primary | ICD-10-CM

## 2020-05-01 NOTE — PROGRESS NOTES
(Telephone encounter due to closed clinics because of COVID19 situation) OW called pt to f/u and let her know her application is not going to be through AN, but a different FA program, as per Veronique SINGH. Pt said that the only specialist she has been seeing is in [facility], not in [facility]. OW will send a message to Veronique HINES to clear it up and will let pt know.

## 2020-05-05 ENCOUNTER — TELEPHONE (OUTPATIENT)
Dept: FAMILY MEDICINE CLINIC | Age: 35
End: 2020-05-05

## 2020-05-05 NOTE — TELEPHONE ENCOUNTER
T/C made to the patient with assistance from [employer]  #[id number] to discuss her recent referral to GYN. Per chart review, the patient saw Dr. Celestine Wallis, last year for her prenatal appointments  and delivery at [facility]. The patient explained that she had emergency Medicaid for the pregnancy and that it has now . She is not familiar with the [facility] financial assistance program.     We discussed the Care Card and that she may call Dr. Elijah De La Garza office to make an appointment for her current GYN needs and talk with them about applying for the Care Card program.   She has my office number, [phone], and will call me if she has any difficulty scheduling an appointment with Dr. Joaquin Carias. Anthony Stone RN

## 2020-05-12 ENCOUNTER — OFFICE VISIT (OUTPATIENT)
Dept: FAMILY MEDICINE CLINIC | Age: 35
End: 2020-05-12

## 2020-05-12 DIAGNOSIS — F43.20 ADJUSTMENT DISORDER IN REMISSION: Primary | ICD-10-CM

## 2020-05-12 NOTE — PROGRESS NOTES
Consent: Navdeep Case, who was seen by synchronous (real-time) audio-video technology, and/or patients healthcare decision maker, is aware that this patient-initiated, Telehealth encounter on 5-12-20 is a billable service, with coverage as determined by her insurance carrier. Patient is aware that he/she may receive a bill and has provided verbal consent to proceed. Pursuant to the emergency declaration under the [facility] [place] [address]50825 waiver authority and the [employer] Act, this Virtual  Visit was conducted, with patient's (and/or legal guardian's) consent, to reduce the patient's risk of exposure to COVID-19 and provide necessary medical care. Services were provided through a video synchronous discussion virtually to substitute for in-person clinic visit. Patient and provider were located at their individual homes. NOTE:  Reporting increase in depression and anxiety that had gotten worse since quarantine. Worrying a lot about the future. Has been here three years. Has 15year old son and 35 year old daughter. Lives with extended family and feels supported by them. Was working but [employer] shut down. Reporting feeling 'overwhelmed,' lacking self confidence, focusing on negative. Clinician utilized verbal narrative therapy intervention during which client able to see that she has come through worse things than what she is experiencing right now. Clinician utilized strengths based approach to facilitate client in being able to verbalize her strengths and ways she as overcome adversity in the past. Client was tearful throughout session, but was more upbeat and positive at the end. She benefited from being supported in identifying her strengths.  She identifies as  'the strong on ein my family, the one everyone else relies on and looks up to,' despite being the youngest.  Clinician engaged client in discussion about finding balance in how she also respects difficult feelings in herself and finds ways of bringing them out. Client expressed understanding and gratitude. Client was able to agree that she was very strong and that all her emotions deserved attention for brief periods of time. Client is to write on cards things in her life that signify for her times when she has overcome and has fought back. She is to carry these with her if she begins to feel overwhelmed again. Client did not want a follow up. She agreed to call the office if symptoms return.

## 2020-05-14 ENCOUNTER — OFFICE VISIT (OUTPATIENT)
Dept: FAMILY MEDICINE CLINIC | Age: 35
End: 2020-05-14

## 2020-05-14 VITALS — BODY MASS INDEX: 33.28 KG/M2 | WEIGHT: 200 LBS

## 2020-05-14 DIAGNOSIS — R10.13 EPIGASTRIC PAIN: Primary | ICD-10-CM

## 2020-05-14 DIAGNOSIS — F43.20 ADJUSTMENT DISORDER, UNSPECIFIED TYPE: ICD-10-CM

## 2020-05-14 DIAGNOSIS — R10.9 ABDOMINAL CRAMPING: ICD-10-CM

## 2020-05-14 RX ORDER — PHENOL/SODIUM PHENOLATE
20 AEROSOL, SPRAY (ML) MUCOUS MEMBRANE DAILY
Qty: 30 TAB | Refills: 2 | Status: SHIPPED | OUTPATIENT
Start: 2020-05-14 | End: 2020-07-14 | Stop reason: SDUPTHER

## 2020-05-14 RX ORDER — DICYCLOMINE HYDROCHLORIDE 10 MG/1
10 CAPSULE ORAL
Qty: 60 CAP | Refills: 2 | Status: SHIPPED | OUTPATIENT
Start: 2020-05-14 | End: 2020-07-14 | Stop reason: SDUPTHER

## 2020-05-14 NOTE — PROGRESS NOTES
Coordination of Care  1. Have you been to the ER, urgent care clinic since your last visit? Hospitalized since your last visit? Yes 1 month ago [facility]    2. Have you seen or consulted any other health care providers outside of the [address] [place] [address] since your last visit? Include any pap smears or colon screening. No    Does the patient need refills? YES    Learning Assessment Complete?  yes  Depression Screening complete in the past 12 months? yes

## 2020-05-14 NOTE — PROGRESS NOTES
Assessment/Plan:    Diagnoses and all orders for this visit:    1. Epigastric pain  -     Omeprazole delayed release (PRILOSEC D/R) 20 mg tablet; Take 1 Tab by mouth daily. 2. Abdominal cramping  -     dicyclomine (BentyL) 10 mg capsule; Take 1 Cap by mouth three (3) times daily as needed for Abdominal Cramps. 3. Adjustment disorder, unspecified type    Pt is reporting doing much better, has decided that she will call LCSW if needed but doesn't feel that she is anxious or depressed  She did not  the vistaril nor does she think that she needs it  She had rx's from her ER visit for omeprazole and bentyl, she couldn't afford the refills (0ver 200 w/out coupon), I have sent her coupons for both meds for $15  Call and f/up PRN   Gyn appt is pending, she has called Dr Watson Valdez office which is not reopened yet due to COVID    Follow-up and Dispositions    · Return if symptoms worsen or fail to improve. Marko Goldmann McFerren, PA-C Ingrid P Brigitte Spike expressed understanding of this plan. An AVS was printed and given to the patient.      ----------------------------------------------------------------------    Chief Complaint   Patient presents with    Abdominal Pain     & back pain x1 month    Irregular Menses     Had Depo shot back in november should last 3 months but has not had a cycle yet       History of Present Illness:    Pt called and consented to doxy me.    She was identified before the visit  She reports that at this 2 week f/up appt she is much improved  I reviewed her office visit with LETITIAW and she confirms that she did not need her anxiety med and that she is feeling much better  She requests refills on her omeprazole and bentyl that were rx'd at the ER but she couldn't go back for her refills due to the cost       Past Medical History:   Diagnosis Date    Asthma     uses inhaler-4 months ago    Gestational hypertension     both pregnancy (pre-eclampsia with first; on Mg postpartum)  Hypertension        Current Outpatient Medications   Medication Sig Dispense Refill    dicyclomine (BentyL) 10 mg capsule Take 1 Cap by mouth three (3) times daily as needed for Abdominal Cramps. 60 Cap 2    Omeprazole delayed release (PRILOSEC D/R) 20 mg tablet Take 1 Tab by mouth daily. 30 Tab 2    hydrOXYzine pamoate (VISTARIL) 25 mg capsule Take 1 Cap by mouth nightly as needed for Sleep. 15 Cap 1    ibuprofen (MOTRIN) 600 mg tablet Take 1 Tab by mouth every six (6) hours as needed for Pain. 30 Tab 1    PNV No12-Iron-FA-DSS-OM-3 29 mg iron-1 mg -50 mg CPKD Take 1 Tab by mouth daily.  loratadine (CLARITIN) 10 mg tablet Take 1 Tab by mouth daily. [place] 1 pastilla cada sahra 90 Tab 3    albuterol (PROVENTIL HFA, VENTOLIN HFA, PROAIR HFA) 90 mcg/actuation inhaler Take 2 Puffs by inhalation every four (4) hours as needed for Wheezing. To send to [facility] once she is qualified 1 Inhaler 11    montelukast (SINGULAIR) 10 mg tablet Take 1 Tab by mouth daily.  [place] 1 pastilla cada sahra 90 Tab 1       No Known Allergies    Social History     Tobacco Use    Smoking status: Never Smoker    Smokeless tobacco: Never Used   Substance Use Topics    Alcohol use: No    Drug use: No       Family History   Problem Relation Age of Onset    No Known Problems Mother     No Known Problems Father        Physical Exam:     Visit Vitals  Wt 200 lb (90.7 kg)   LMP  (LMP Unknown)   BMI 33.28 kg/m²       A&Ox3  WDWN NAD  Respirations normal and non labored

## 2020-07-14 ENCOUNTER — OFFICE VISIT (OUTPATIENT)
Dept: FAMILY MEDICINE CLINIC | Age: 35
End: 2020-07-14

## 2020-07-14 VITALS — WEIGHT: 200 LBS | BODY MASS INDEX: 33.32 KG/M2 | HEIGHT: 65 IN

## 2020-07-14 DIAGNOSIS — R52 PAIN: Primary | ICD-10-CM

## 2020-07-14 DIAGNOSIS — R10.9 ABDOMINAL CRAMPING: ICD-10-CM

## 2020-07-14 DIAGNOSIS — R10.13 EPIGASTRIC PAIN: ICD-10-CM

## 2020-07-14 RX ORDER — PHENOL/SODIUM PHENOLATE
20 AEROSOL, SPRAY (ML) MUCOUS MEMBRANE DAILY
Qty: 30 TAB | Refills: 2 | Status: SHIPPED | OUTPATIENT
Start: 2020-07-14

## 2020-07-14 RX ORDER — DICYCLOMINE HYDROCHLORIDE 10 MG/1
10 CAPSULE ORAL
Qty: 60 CAP | Refills: 2 | Status: SHIPPED | OUTPATIENT
Start: 2020-07-14

## 2020-07-14 RX ORDER — DEXTROMETHORPHAN HYDROBROMIDE, GUAIFENESIN 5; 100 MG/5ML; MG/5ML
650 LIQUID ORAL
Qty: 30 TAB | Refills: 1 | Status: SHIPPED | OUTPATIENT
Start: 2020-07-14

## 2020-07-14 NOTE — PROGRESS NOTES
Coordination of Care  1. Have you been to the ER, urgent care clinic since your last visit? Hospitalized since your last visit? No    2. Have you seen or consulted any other health care providers outside of the [address] [place] [address] since your last visit? Include any pap smears or colon screening. No    Does the patient need refills? YES    Learning Assessment Complete?  yes  Depression Screening complete in the past 12 months? yes     Patient stated was concern for Covid-19 and went today 7/14/20120, to take the test. Hernando Smallwood, CMA

## 2020-07-14 NOTE — PROGRESS NOTES
Assessment/Plan:    Diagnoses and all orders for this visit:    1. Pain  -     acetaminophen (Tylenol 8 Hour) 650 mg TbER; Take 1 Tab by mouth every six to eight (6-8) hours as needed for Pain. 2. Epigastric pain  -     Omeprazole delayed release (PRILOSEC D/R) 20 mg tablet; Take 1 Tab by mouth daily. 3. Abdominal cramping  -     dicyclomine (BentyL) 10 mg capsule; Take 1 Cap by mouth three (3) times daily as needed for Abdominal Cramps. Discharge RN: Please teach her to use good rx for her refills   F/up PRN     Follow-up and Dispositions    · Return if symptoms worsen or fail to improve. ANNEMARIE Choi expressed understanding of this plan. An AVS was printed and given to the patient.      ----------------------------------------------------------------------    Chief Complaint   Patient presents with    Abdominal Pain     f/u    Medication Refill       History of Present Illness:    Pt called and consented to virtual telephone visit, she declined video  She was identified by name and   She is calling today in order to get refills of her omeprazole and bentyl. She was initially rx'd these at the ER, couldn't get them due to cost, so I re wrote for them with good rx she was able to get them. She took them for one month and her sxs completely resolved (abdominal pain, bloating, not able to sleep due to this pain). Then, she didn't go back to get her refills so the sxs have returned. She is not pregnant, \"I took a test and it is negative\" and she was advised that she should not take bentyl if she becomes pregnant. She states that she is aware  She has no diarrhea or constipation and stools are normal  She asks for a rx for tylenol if needed for pain, she was advised to not take NSAIDs at this time due to her sxs.  She understands she states     Past Medical History:   Diagnosis Date    Asthma     uses inhaler-4 months ago    Gestational hypertension     both pregnancy (pre-eclampsia with first; on Mg postpartum)    Hypertension        Current Outpatient Medications   Medication Sig Dispense Refill    Omeprazole delayed release (PRILOSEC D/R) 20 mg tablet Take 1 Tab by mouth daily. 30 Tab 2    dicyclomine (BentyL) 10 mg capsule Take 1 Cap by mouth three (3) times daily as needed for Abdominal Cramps. 60 Cap 2    acetaminophen (Tylenol 8 Hour) 650 mg TbER Take 1 Tab by mouth every six to eight (6-8) hours as needed for Pain. 30 Tab 1    ibuprofen (MOTRIN) 600 mg tablet Take 1 Tab by mouth every six (6) hours as needed for Pain. 30 Tab 1    PNV No12-Iron-FA-DSS-OM-3 29 mg iron-1 mg -50 mg CPKD Take 1 Tab by mouth daily.  loratadine (CLARITIN) 10 mg tablet Take 1 Tab by mouth daily. [place] 1 pastilla cada sahra 90 Tab 3    albuterol (PROVENTIL HFA, VENTOLIN HFA, PROAIR HFA) 90 mcg/actuation inhaler Take 2 Puffs by inhalation every four (4) hours as needed for Wheezing. To send to [facility] once she is qualified 1 Inhaler 11    montelukast (SINGULAIR) 10 mg tablet Take 1 Tab by mouth daily.  [place] 1 pastilla cada sahra 90 Tab 1       No Known Allergies    Social History     Tobacco Use    Smoking status: Never Smoker    Smokeless tobacco: Never Used   Substance Use Topics    Alcohol use: No    Drug use: No       Family History   Problem Relation Age of Onset    No Known Problems Mother     No Known Problems Father        Physical Exam:     Visit Vitals  Ht 5' 5\" (1.651 m)   Wt 200 lb (90.7 kg)   BMI 33.28 kg/m²       A&Ox3  WDWN NAD  Respirations normal and non labored

## 2020-07-14 NOTE — PROGRESS NOTES
Reviewed discharge instructions with the assistance of [facility] . Reviewed medications and Goodrx coupon process. Pt texted the [address] office number. Yasmeen Garcia.  Shavon, [facility] [facility]

## 2021-02-17 ENCOUNTER — TELEPHONE (OUTPATIENT)
Dept: FAMILY MEDICINE CLINIC | Age: 36
End: 2021-02-17

## 2021-02-17 NOTE — TELEPHONE ENCOUNTER
Denis from the pt Sonya Loving was the . The pt was asking for an appt she is stating she is having a stomach pain and nausea, sometimes vomiting, and H/A, no diarrhea or any other sxs. The pt was asked if she had been around anyone with suspected or known to have Covid. The pt stated she did not think so. The pt stated she has this a long time and has taken medicine for it but she does not have anymore. The pt was advised to have a Covid test done to make sure we can rule this out. The pt was given the Covid hotline # to call the [facility] 21 [phone]. The pt was also given the same day appt line to call for an appt if needed she could have a VV appt. The pt was told if she went to the clinic with any sxs of Covid she would not be permitted into the clinic. So this nurse could not schedule her at this time for an F2F appt. It has been 6 month's since the pt's last appt. It is noted the pt has had adominal pain and cramps in her abdomen and has taken Omeprazole and Bentyl in the past. The medication resolved her sx's. Routed this to the provider.  Arie De Leon RN

## 2021-02-18 NOTE — TELEPHONE ENCOUNTER
Tc to the pt with the assistance of the  Jordan Cuello. Spoke with the pt. The pt stated, I am feeling better now, the fever is gone. The pt stated  I did not get the Covid test I am controling the sx myself. The pt was asked if there is any possibility that she could be pregnant, and she stated no she did not think so. The pt was given the same day appt line # to call and schedule an appt if she needs to speak with the Dr. The pt verbalized understanding.  Piter Tam RN

## 2022-02-21 NOTE — PROGRESS NOTES
Results for orders placed or performed in visit on 05/09/17   AMB POC HEMOGLOBIN (HGB)   Result Value Ref Range    Hemoglobin (POC) 11.8 show

## 2022-03-19 PROBLEM — O14.93 PREECLAMPSIA, THIRD TRIMESTER: Status: ACTIVE | Noted: 2019-02-06

## 2022-03-20 PROBLEM — Z34.90 PREGNANCY: Status: ACTIVE | Noted: 2018-09-04

## 2022-05-06 NOTE — PROGRESS NOTES
0745 Shift assessment done. Pt resting comfortably. Call bell within reach. 0800 Dr Sravani Johnson at bedside to assess labor progress. SVE 5-6/80/-3. Pt turned left lateral with peanut ball in place. 0850 SVE 8/100/bulging bow 
 
0854 Dr Sravani Johnson updated on sve 
 
3197 Dr Sravani Johnson updated on SVE 9.5/100/bulging bag and small to mod amount or dark red bloody show. 2393 Pitocin off 
 
1000 Dr Sravani Johnson at bedside to assess labor. SVE 10/100 
 
1003 Left tilt 12 Dr Arely Womack updated pt is feeling intense pressure and desires to push. Dr Sravani Johnson will be st bedside 5-10min [address] Dr Sravani Johnson called to bedside due to prolonged decel. Pt turned right lateral back to left tilt and trendelenburg. O2 remains on. 
 
1044 DR Will at bedside 1051  of viable infant female 1330 Attempted to get pt up to bathroom. Pt verbalized she had full sensation in her legs. After approx 3 steps pt felt light headed and escorted back to bed. Dizziness resolved once in bed. Straight cath done for 125cc of dark yellow urine. Pt encouraged to drink more water. Pericare/pad/gown changed. Linens changed. 5 Prenatal labs drawn and sent to lab 
 
[address] [place] [address] at bedside throughout the day when translation needed, 
 
1513 Bedside report given to [employer] Standing/Walking

## 2023-03-16 NOTE — PROGRESS NOTES
Threatened AB note    Norma Thomas is a ,  44 y.o. female OTHER Patient's last menstrual period was 2023. making her 8 weeks pregnant by dates. She has not had prenatal care. LMP history:  The date of her LMP is  certain. Her last menstrual period was normal and lasted for 4 to 5 days. A urine pregnancy test was positive 2 weeks ago. She was not on the pill at conception. Based on her LMP, her EDC is 10/27/23 and her EGA is 8 weeks, 0 days. Her menstrual cycles are regular and occur approximately every 28 days  and range from 3 to 5 days. The last menses lasted  the usual number of days. She presents with minimal symptoms. She has had no bleeding. She has not passed tissue. She had a positive pregnancy test 2 weeks ago. She has not had a recent pelvic ultrasound. Ultrasound data:  She had an   the ultrasound tech today which revealed a viable kuhn pregnancy with a gestational age of 10 weeks and 4 days according to a gestational sac measurement. There does not appear to be a fetal pole on US. Relevant past pregnancy history:   She has the following pregnancy history: one previous late  delivery for PIH. She has no history of  delivery. Relevant past medical history:(relevant to this pregnancy): noncontributory. Her occupation is: [employer]. Her past medical history is not significant for risk factors for ectopic pregnancy. She has not had pelvic pain. The patient specifically denies right or left pelvic pain. She does not have a history of a spontaneous . She has not had a recent injury or trauma. Additional complaints: none.      Past Medical History:   Diagnosis Date    Asthma     uses inhaler-4 months ago    Gestational hypertension both pregnancy (pre-eclampsia with first; on Mg postpartum)    Hypertension      No past surgical history on file. Social History     Occupational History    Not on file   Tobacco Use    Smoking status: Never    Smokeless tobacco: Never   Substance and Sexual Activity    Alcohol use: No    Drug use: No    Sexual activity: Yes     Partners: Male     Birth control/protection: None     Family History   Problem Relation Age of Onset    No Known Problems Mother     No Known Problems Father        No Known Allergies  Prior to Admission medications    Medication Sig Start Date End Date Taking? Authorizing Provider   acetaminophen (Tylenol 8 Hour) 650 mg TbER Take 1 Tab by mouth every six to eight (6-8) hours as needed for Pain. 7/14/20  Yes Ronen Paz PA   PNV No12-Iron-FA-DSS-OM-3 29 mg iron-1 mg -50 mg CPKD Take 1 Tab by mouth daily. Yes Provider, Historical   Omeprazole delayed release (PRILOSEC D/R) 20 mg tablet Take 1 Tab by mouth daily. Patient not taking: Reported on 3/17/2023 7/14/20   Ronen Paz PA   dicyclomine (BentyL) 10 mg capsule Take 1 Cap by mouth three (3) times daily as needed for Abdominal Cramps. Patient not taking: Reported on 3/17/2023 7/14/20   Ronen Paz PA   ibuprofen (MOTRIN) 600 mg tablet Take 1 Tab by mouth every six (6) hours as needed for Pain. Patient not taking: Reported on 3/17/2023 2/8/19   Mak Calderon MD   loratadine (CLARITIN) 10 mg tablet Take 1 Tab by mouth daily. Pettit 1 dylan bocanegra  Patient not taking: Reported on 3/17/2023 4/20/18   Ronen Paz PA   albuterol (PROVENTIL HFA, VENTOLIN HFA, PROAIR HFA) 90 mcg/actuation inhaler Take 2 Puffs by inhalation every four (4) hours as needed for Wheezing. To send to [facility] once she is qualified  Patient not taking: Reported on 3/17/2023 4/20/18   Ronen Paz PA   montelukast (SINGULAIR) 10 mg tablet Take 1 Tab by mouth daily.  Pettit 1 carliilla cada sahra  Patient not taking: Reported on 3/17/2023 4/20/18   Ramona Paz PA        Review of Systems: History obtained from the patient  Constitutional: negative for weight loss, fever, night sweats  Breast: negative for breast lumps, nipple discharge, galactorrhea  GI: negative for change in bowel habits, abdominal pain, black or bloody stools  : negative for frequency, dysuria, hematuria, vaginal discharge  MSK: negative for back pain, joint pain, muscle pain  Skin: negative for itching, rash, hives  Psych: negative for anxiety, depression, change in mood      Objective:  Visit Vitals  /77   Wt 210 lb (95.3 kg)   LMP 01/20/2023   BMI 34.95 kg/m²       Physical Exam:   PHYSICAL EXAMINATION    Constitutional  Appearance: well-nourished, well developed, alert, in no acute distress    Gastrointestinal  Abdominal Examination: abdomen non-tender to palpation, normal bowel sounds, no masses present  Liver and spleen: no hepatomegaly present, spleen not palpable  Hernias: no hernias identified    Genitourinary  External Genitalia: normal appearance for age, no discharge present, no tenderness present, no inflammatory lesions present, no masses present, no atrophy present  Vagina: normal vaginal vault without central or paravaginal defects, bloody discharge present, no inflammatory lesions present, no masses present  Bladder: non-tender to palpation  Urethra: appears normal  Cervix: normal   Uterus: enlarged, soft, mildly tender with normal shape and consistency  Adnexa: no adnexal tenderness present, no adnexal masses present  Perineum: perineum within normal limits, no evidence of trauma, no rashes or skin lesions present  Anus: anus within normal limits, no hemorrhoids present  Inguinal Lymph Nodes: no lymphadenopathy present    Skin  General Inspection: no rash, no lesions identified    Neurologic/Psychiatric  Mental Status:  Orientation: grossly oriented to person, place and time  Mood and Affect: mood normal, affect appropriate    Assessment:   Threatened AB    Plan:   US--repeat in 10 days  RTO: prn bleeding    Transvaginal First Trimester Ultrasound Procedure    Ratna Thakkar is a ,  44 y.o. female OTHER Patient's last menstrual period was 2023. This makes her currently 8 weeks and 0 days of gestation by dates. She is here today for early pregnancy ultrasound for suspected non-viability. Ultrasound results: A possibly non-viable intrauterine pregnancy with no visible fetal pole is seen. The fetus is not seen. The yolk sac is not visible. The gestational sac measurable and consistent with 5 weeks and 4 days. Subchorionic hemorrhage was not seen  Right Ovary - Not well seen   Left Ovary - Not well seen  Comment: suspected non-viable IUP is seen. Repeat US in 10 days.

## 2023-03-17 ENCOUNTER — INITIAL PRENATAL (OUTPATIENT)
Dept: OBGYN CLINIC | Age: 38
End: 2023-03-17

## 2023-03-17 VITALS — DIASTOLIC BLOOD PRESSURE: 77 MMHG | SYSTOLIC BLOOD PRESSURE: 111 MMHG | WEIGHT: 210 LBS | BODY MASS INDEX: 34.95 KG/M2

## 2023-03-17 DIAGNOSIS — O20.0 THREATENED ABORTION: Primary | ICD-10-CM

## 2023-03-17 DIAGNOSIS — Z12.4 ENCOUNTER FOR PAPANICOLAOU SMEAR FOR CERVICAL CANCER SCREENING: ICD-10-CM

## 2023-03-17 DIAGNOSIS — Z01.42: ICD-10-CM

## 2023-03-17 DIAGNOSIS — Z34.90 ENCOUNTER FOR PRENATAL CARE: ICD-10-CM

## 2023-03-21 LAB — BACTERIA UR CULT: NO GROWTH

## 2023-03-23 LAB

## 2023-03-27 ENCOUNTER — ROUTINE PRENATAL (OUTPATIENT)
Dept: OBGYN CLINIC | Age: 38
End: 2023-03-27
Payer: MEDICAID

## 2023-03-27 VITALS — BODY MASS INDEX: 35.28 KG/M2 | DIASTOLIC BLOOD PRESSURE: 76 MMHG | SYSTOLIC BLOOD PRESSURE: 116 MMHG | WEIGHT: 212 LBS

## 2023-03-27 DIAGNOSIS — Z34.90 ENCOUNTER FOR PRENATAL CARE: Primary | ICD-10-CM

## 2023-03-27 PROCEDURE — 76817 TRANSVAGINAL US OBSTETRIC: CPT | Performed by: OBSTETRICS & GYNECOLOGY

## 2023-03-27 PROCEDURE — 0502F SUBSEQUENT PRENATAL CARE: CPT | Performed by: OBSTETRICS & GYNECOLOGY

## 2023-03-27 NOTE — PROGRESS NOTES
US today for viability and dates:    Transvaginal First Trimester Ultrasound Procedure    Janet Paz is a ,  43 y.o. female OTHER Patient's last menstrual period was 2023. This makes her currently unknown weeks and days of gestation by dates. She is here today for early pregnancy ultrasound for pregnancy dating. Ultrasound results:   A kuhn intrauterine pregnancy with visible cardiac activity is seen. The yolk sac is visible and measures approximately 0.37 cm in diameter.   The crown rump length of the fetus is measurable at 1.01 cm, consistent with 7 weeks and 1 day  Subchorionic hemorrhage was not seen  Comment: normal fluid and yolk sac    RTO 5 weeks for labs and follow up

## 2023-05-02 ENCOUNTER — ROUTINE PRENATAL (OUTPATIENT)
Dept: OBGYN CLINIC | Age: 38
End: 2023-05-02
Payer: MEDICAID

## 2023-05-02 VITALS — SYSTOLIC BLOOD PRESSURE: 125 MMHG | DIASTOLIC BLOOD PRESSURE: 80 MMHG | WEIGHT: 211.8 LBS | BODY MASS INDEX: 35.25 KG/M2

## 2023-05-02 DIAGNOSIS — Z13.79 GENETIC TESTING: ICD-10-CM

## 2023-05-02 DIAGNOSIS — Z34.90 ENCOUNTER FOR PRENATAL CARE: Primary | ICD-10-CM

## 2023-05-02 LAB
ANTIBODY SCREEN, EXTERNAL: NEGATIVE
HBSAG, EXTERNAL: NEGATIVE
HEPATITIS C AB,   EXT: NEGATIVE
HIV, EXTERNAL: NEGATIVE
RPR, EXTERNAL: NEGATIVE
RUBELLA, EXTERNAL: NEGATIVE

## 2023-05-02 PROCEDURE — 0502F SUBSEQUENT PRENATAL CARE: CPT | Performed by: OBSTETRICS & GYNECOLOGY

## 2023-05-02 RX ORDER — ONDANSETRON 8 MG/1
8 TABLET, ORALLY DISINTEGRATING ORAL
Qty: 30 TABLET | Refills: 2 | Status: SHIPPED | OUTPATIENT
Start: 2023-05-02

## 2023-05-04 LAB
ABO GROUP BLD: NORMAL
BLD GP AB SCN SERPL QL: NEGATIVE
ERYTHROCYTE [DISTWIDTH] IN BLOOD BY AUTOMATED COUNT: 16.1 % (ref 11.7–15.4)
HBV SURFACE AG SERPL QL IA: NEGATIVE
HCT VFR BLD AUTO: 36.1 % (ref 34–46.6)
HCV IGG SERPL QL IA: NON REACTIVE
HGB A MFR BLD ELPH: 97.2 % (ref 96.4–98.8)
HGB A2 MFR BLD ELPH: 2.8 % (ref 1.8–3.2)
HGB BLD-MCNC: 11.8 G/DL (ref 11.1–15.9)
HGB F MFR BLD ELPH: 0 % (ref 0–2)
HGB FRACT BLD-IMP: NORMAL
HGB S MFR BLD ELPH: 0 %
HIV 1+2 AB+HIV1 P24 AG SERPL QL IA: NON REACTIVE
MCH RBC QN AUTO: 25.4 PG (ref 26.6–33)
MCHC RBC AUTO-ENTMCNC: 32.7 G/DL (ref 31.5–35.7)
MCV RBC AUTO: 78 FL (ref 79–97)
PLATELET # BLD AUTO: 333 X10E3/UL (ref 150–450)
RBC # BLD AUTO: 4.65 X10E6/UL (ref 3.77–5.28)
RH BLD: POSITIVE
RPR SER QL: NON REACTIVE
RUBV IGG SERPL IA-ACNC: 17.7 INDEX
WBC # BLD AUTO: 11 X10E3/UL (ref 3.4–10.8)

## 2023-05-19 RX ORDER — ONDANSETRON 8 MG/1
8 TABLET, ORALLY DISINTEGRATING ORAL EVERY 8 HOURS PRN
COMMUNITY
Start: 2023-05-02 | End: 2023-06-30

## 2023-05-19 RX ORDER — MONTELUKAST SODIUM 10 MG/1
10 TABLET ORAL DAILY
COMMUNITY
Start: 2018-04-20 | End: 2023-06-30

## 2023-05-19 RX ORDER — ALBUTEROL SULFATE 90 UG/1
2 AEROSOL, METERED RESPIRATORY (INHALATION) EVERY 4 HOURS PRN
COMMUNITY
Start: 2018-04-20 | End: 2023-06-30

## 2023-05-19 RX ORDER — LORATADINE 10 MG/1
10 TABLET ORAL DAILY
COMMUNITY
Start: 2018-04-20 | End: 2023-06-30

## 2023-05-19 RX ORDER — SENNOSIDES 8.6 MG
650 CAPSULE ORAL
COMMUNITY
Start: 2020-07-14 | End: 2023-06-30

## 2023-05-19 RX ORDER — OMEPRAZOLE 20 MG/1
20 TABLET, DELAYED RELEASE ORAL DAILY
COMMUNITY
Start: 2020-07-14 | End: 2023-06-30

## 2023-05-19 RX ORDER — IBUPROFEN 600 MG/1
600 TABLET ORAL EVERY 6 HOURS PRN
COMMUNITY
Start: 2019-02-08 | End: 2023-06-30

## 2023-05-19 RX ORDER — DICYCLOMINE HYDROCHLORIDE 10 MG/1
10 CAPSULE ORAL 3 TIMES DAILY PRN
COMMUNITY
Start: 2020-07-14 | End: 2023-06-30

## 2023-06-02 ENCOUNTER — ROUTINE PRENATAL (OUTPATIENT)
Age: 38
End: 2023-06-02

## 2023-06-02 VITALS — SYSTOLIC BLOOD PRESSURE: 117 MMHG | WEIGHT: 208.2 LBS | DIASTOLIC BLOOD PRESSURE: 79 MMHG

## 2023-06-02 DIAGNOSIS — Z34.80 ENCOUNTER FOR SUPERVISION OF OTHER NORMAL PREGNANCY, UNSPECIFIED TRIMESTER: Primary | ICD-10-CM

## 2023-06-02 DIAGNOSIS — Z34.80 SUPERVISION OF OTHER NORMAL PREGNANCY, ANTEPARTUM: ICD-10-CM

## 2023-06-02 PROBLEM — O14.93 PREECLAMPSIA, THIRD TRIMESTER: Status: RESOLVED | Noted: 2019-02-06 | Resolved: 2023-06-02

## 2023-06-06 LAB
AFP INTERP SERPL-IMP: NORMAL
AFP MOM SERPL: 1.07
AFP SERPL-MCNC: 32.6 NG/ML
AGE AT DELIVERY: 39.6 YR
COMMENT: NORMAL
GA METHOD: NORMAL
GA: 16.6 WEEKS
IDDM PATIENT QL: NO
Lab: NORMAL
MAT SCN FOR FETAL ABNORMALITIES SERPL: NORMAL
MULTIPLE PREGNANCY: NO
NEURAL TUBE DEFECT RISK FETUS: 9862

## 2023-06-21 ENCOUNTER — HOSPITAL ENCOUNTER (OUTPATIENT)
Facility: HOSPITAL | Age: 38
Discharge: HOME | End: 2023-06-24
Payer: MEDICAID

## 2023-06-21 PROCEDURE — 76811 OB US DETAILED SNGL FETUS: CPT

## 2023-06-21 NOTE — PROCEDURES
Indication  ========    Advanced maternal age    Method  ======    Transabdominal ultrasound examination. View: Sufficient    Dating  ======    Previous Ultrasound on: 3/17/2023  Type of prior assessment: GA  GA at prior assessment date 5 w + 4 d  GA by previous U/S 23 w + 2 d  RODDY by previous Ultrasound: 11/13/2023  Ultrasound examination on: 6/21/2023  GA by U/S based upon: AC, BPD, Femur, HC  GA by U/S 19 w + 4 d  RODDY by U/S: 11/11/2023  Assigned: based on ultrasound (GA), selected on 06/21/2023  Assigned GA 19 w + 2 d  Assigned RODDY: 11/13/2023    Fetal Biometry  ============    Standard  BPD 46.4 mm 20w 0d 80% Hadlock  OFD 59.2 mm 20w 4d 88% Andrey  .0 mm 19w 3d 51% Hadlock  Cerebellum tr 18.4 mm 18w 1d 8% Hill  .6 mm 19w 4d 57% Hadlock  Femur 29.7 mm 19w 1d 38% Hadlock  Humerus 28.8 mm 19w 2d 54% Andrey   g 19w 2d 53% Hadlock  EFW (lb) 0 lb  EFW (oz) 10 oz  EFW by: Hadlock (BPD-HC-AC-FL)  Extended   7.2 mm  CM 5.1 mm  61% Nicolaides  Head / Face / Neck  Nasal bone: present  Other Structures   bpm    General Evaluation  ==============    Cardiac activity present.  bpm. Fetal movements: visualized. Presentation: breech  Placenta: Placental site: posterior  Umbilical cord: Cord vessels: 3 vessel cord. Insertion site: NOT VISUALIZED  Amniotic fluid: Amount of AF: normal amount.  MVP 5.4 cm    Fetal Anatomy  ===========    Cranium: normal  Lateral ventricles: normal  Choroid plexus: normal  Midline falx: normal  Cavum septi pellucidi: normal  Cerebellum: normal  Cisterna magna: normal  Head / Neck  Neck: suboptimal  Lips: normal  Profile: normal  Nose: normal  Face  Coronal face: normal  Nasal bone: present  Palate: not visualized  Maxilla: not visualized  Mandible: not visualized  Orbits: normal  4-chamber view: normal  RVOT view: not visualized  LVOT view: not visualized  3-vessel view: not visualized  3-vessel-trachea view: not visualized  Heart / Thorax  Situs: situs solitus

## 2023-06-21 NOTE — CONSULTS
Session ID: [id number]  Request ID: [id number]  Language: Japanese  Status: Fulfilled   ID: #[id number]   Name: Pablito Salinas

## 2023-06-30 ENCOUNTER — ROUTINE PRENATAL (OUTPATIENT)
Age: 38
End: 2023-06-30

## 2023-06-30 VITALS
DIASTOLIC BLOOD PRESSURE: 74 MMHG | SYSTOLIC BLOOD PRESSURE: 113 MMHG | BODY MASS INDEX: 49.48 KG/M2 | HEIGHT: 55 IN | WEIGHT: 213.8 LBS

## 2023-06-30 DIAGNOSIS — Z34.80 ENCOUNTER FOR SUPERVISION OF OTHER NORMAL PREGNANCY, UNSPECIFIED TRIMESTER: ICD-10-CM

## 2023-07-17 ENCOUNTER — HOSPITAL ENCOUNTER (OUTPATIENT)
Facility: HOSPITAL | Age: 38
Discharge: HOME | End: 2023-07-20
Payer: MEDICAID

## 2023-07-17 PROCEDURE — 76816 OB US FOLLOW-UP PER FETUS: CPT

## 2023-07-18 NOTE — PROCEDURES
PATIENT: Mikie Epp   -  : 1985   -  DOS:2023   -  INTERPRETING PROVIDER:Raji Caldera,   Indication  ========    Advanced maternal age subopt views, obesity    Maternal Assessment  =================    BP syst 126 mmHg  BP diast 79 mmHg    Method  ======    Transabdominal ultrasound examination. View: Sufficient    Pregnancy  =========    Cuevas pregnancy. Number of fetuses: 1    Dating  ======    Previous Ultrasound on: 3/17/2023  Type of prior assessment: GA  GA at prior assessment date 5 w + 4 d  GA by previous U/S 23 w + 0 d  RODDY by previous Ultrasound: 2023  Ultrasound examination on: 2023  GA by U/S based upon: [facility], BPD, Femur, HC  GA by U/S 23 w + 2 d  RODDY by U/S: 2023  Assigned: based on ultrasound (GA), selected on 2023  Assigned GA 23 w + 0 d  Assigned RODDY: 2023    Fetal Biometry  ============    Standard  BPD 55.8 mm 23w 0d 46% Hadlock  OFD 72.7 mm 24w 1d 81% Andrey  .1 mm 22w 4d 21% Hadlock  .3 mm 24w 0d 72% Hadlock  Femur 41.3 mm 23w 3d 52% Hadlock  Humerus 36.7 mm 22w 5d 33% Andrey   g 23w 3d 70% Hadlock  EFW (lb) 1 lb  EFW (oz) 5 oz  EFW by: Hadlock (BPD-HC-AC-FL)  Other Structures   bpm    General Evaluation  ==============    Cardiac activity present.  bpm. Fetal movements: visualized. Presentation: breech  Placenta: Placental site: posterior  Umbilical cord: Cord vessels: 3 vessel cord. Insertion site: NOT VISUALIZED  Amniotic fluid: Amount of AF: normal amount.  MVP 6.1 cm    Fetal Anatomy  ===========    Cranium: normal  Midline falx: visualized  Head / Neck  Neck: visualized  Profile: normal  Face  Coronal face: normal  Palate: normal  Maxilla: normal  Mandible: normal  4-chamber view: normal  RVOT view: normal  LVOT view: normal  3-vessel view: normal  3-vessel-trachea view: normal  Heart / Thorax  Aortic arch view: normal  Bicaval view: normal  Ductal arch view: normal  Fetal sex: female  Wants to

## 2023-07-28 ENCOUNTER — ROUTINE PRENATAL (OUTPATIENT)
Age: 38
End: 2023-07-28

## 2023-07-28 VITALS — WEIGHT: 214.3 LBS | DIASTOLIC BLOOD PRESSURE: 84 MMHG | SYSTOLIC BLOOD PRESSURE: 111 MMHG | BODY MASS INDEX: 311.3 KG/M2

## 2023-07-28 DIAGNOSIS — Z34.80 ENCOUNTER FOR SUPERVISION OF OTHER NORMAL PREGNANCY, UNSPECIFIED TRIMESTER: ICD-10-CM

## 2023-07-28 PROCEDURE — 0502F SUBSEQUENT PRENATAL CARE: CPT | Performed by: OBSTETRICS & GYNECOLOGY

## 2023-08-17 ENCOUNTER — HOSPITAL ENCOUNTER (OUTPATIENT)
Facility: HOSPITAL | Age: 38
Discharge: HOME | End: 2023-08-17
Payer: MEDICAID

## 2023-08-17 PROCEDURE — 76816 OB US FOLLOW-UP PER FETUS: CPT

## 2023-08-25 ENCOUNTER — ROUTINE PRENATAL (OUTPATIENT)
Age: 38
End: 2023-08-25
Payer: MEDICAID

## 2023-08-25 VITALS
BODY MASS INDEX: 313.48 KG/M2 | WEIGHT: 215.8 LBS | DIASTOLIC BLOOD PRESSURE: 67 MMHG | SYSTOLIC BLOOD PRESSURE: 102 MMHG

## 2023-08-25 DIAGNOSIS — Z34.80 ENCOUNTER FOR SUPERVISION OF OTHER NORMAL PREGNANCY, UNSPECIFIED TRIMESTER: Primary | ICD-10-CM

## 2023-08-25 DIAGNOSIS — Z23 ENCOUNTER FOR IMMUNIZATION: ICD-10-CM

## 2023-08-25 PROCEDURE — 90715 TDAP VACCINE 7 YRS/> IM: CPT | Performed by: OBSTETRICS & GYNECOLOGY

## 2023-08-25 PROCEDURE — 90471 IMMUNIZATION ADMIN: CPT | Performed by: OBSTETRICS & GYNECOLOGY

## 2023-08-25 PROCEDURE — 0502F SUBSEQUENT PRENATAL CARE: CPT | Performed by: OBSTETRICS & GYNECOLOGY

## 2023-08-26 LAB
BASOPHILS # BLD AUTO: 0.1 X10E3/UL (ref 0–0.2)
BASOPHILS NFR BLD AUTO: 1 %
EOSINOPHIL # BLD AUTO: 0.2 X10E3/UL (ref 0–0.4)
EOSINOPHIL NFR BLD AUTO: 2 %
ERYTHROCYTE [DISTWIDTH] IN BLOOD BY AUTOMATED COUNT: 14.2 % (ref 11.7–15.4)
GLUCOSE 1H P 50 G GLC PO SERPL-MCNC: 161 MG/DL (ref 70–139)
HCT VFR BLD AUTO: 33.4 % (ref 34–46.6)
HGB BLD-MCNC: 11 G/DL (ref 11.1–15.9)
IMM GRANULOCYTES # BLD AUTO: 0 X10E3/UL (ref 0–0.1)
IMM GRANULOCYTES NFR BLD AUTO: 0 %
LYMPHOCYTES # BLD AUTO: 2.1 X10E3/UL (ref 0.7–3.1)
LYMPHOCYTES NFR BLD AUTO: 19 %
MCH RBC QN AUTO: 27 PG (ref 26.6–33)
MCHC RBC AUTO-ENTMCNC: 32.9 G/DL (ref 31.5–35.7)
MCV RBC AUTO: 82 FL (ref 79–97)
MONOCYTES # BLD AUTO: 0.7 X10E3/UL (ref 0.1–0.9)
MONOCYTES NFR BLD AUTO: 7 %
NEUTROPHILS # BLD AUTO: 8 X10E3/UL (ref 1.4–7)
NEUTROPHILS NFR BLD AUTO: 71 %
PLATELET # BLD AUTO: 330 X10E3/UL (ref 150–450)
RBC # BLD AUTO: 4.07 X10E6/UL (ref 3.77–5.28)
WBC # BLD AUTO: 11.1 X10E3/UL (ref 3.4–10.8)

## 2023-09-01 ENCOUNTER — NURSE ONLY (OUTPATIENT)
Age: 38
End: 2023-09-01

## 2023-09-01 DIAGNOSIS — Z34.80 ENCOUNTER FOR SUPERVISION OF OTHER NORMAL PREGNANCY, UNSPECIFIED TRIMESTER: ICD-10-CM

## 2023-09-01 DIAGNOSIS — R73.09 ABNORMAL GLUCOSE TOLERANCE TEST: Primary | ICD-10-CM

## 2023-09-02 LAB
GLUCOSE 1H P 100 G GLC PO SERPL-MCNC: 164 MG/DL (ref 70–179)
GLUCOSE 2H P 100 G GLC PO SERPL-MCNC: 133 MG/DL (ref 70–154)
GLUCOSE 3H P 100 G GLC PO SERPL-MCNC: 91 MG/DL (ref 70–139)
GLUCOSE P FAST SERPL-MCNC: 80 MG/DL (ref 70–94)
Lab: NORMAL

## 2023-09-13 ENCOUNTER — ROUTINE PRENATAL (OUTPATIENT)
Age: 38
End: 2023-09-13

## 2023-09-13 VITALS — WEIGHT: 220 LBS | SYSTOLIC BLOOD PRESSURE: 118 MMHG | BODY MASS INDEX: 319.58 KG/M2 | DIASTOLIC BLOOD PRESSURE: 82 MMHG

## 2023-09-13 VITALS — HEART RATE: 71 BPM | DIASTOLIC BLOOD PRESSURE: 80 MMHG | SYSTOLIC BLOOD PRESSURE: 120 MMHG

## 2023-09-13 DIAGNOSIS — Z34.80 ENCOUNTER FOR SUPERVISION OF OTHER NORMAL PREGNANCY, UNSPECIFIED TRIMESTER: Primary | ICD-10-CM

## 2023-09-13 DIAGNOSIS — O09.522 ADVANCED MATERNAL AGE IN MULTIGRAVIDA, SECOND TRIMESTER: Primary | ICD-10-CM

## 2023-09-13 PROCEDURE — 0502F SUBSEQUENT PRENATAL CARE: CPT | Performed by: OBSTETRICS & GYNECOLOGY

## 2023-09-13 NOTE — PROCEDURES
Anatomy appears normal as noted above. Normal fluid and fetal movement are noted. Growth is appropriate for gestational age at 50%, abdominal circumference at 50%. An echo is seen in the bladder suggestive of a ureterocele. That raises the question of a duplicated collecting system    Plan of Care  ==========    Rylan Gutierrez declines a professional  preferring her son to serve this purpose today. Advanced maternal age/possible ureterocele/hx mild fetal pyelectasis (resolved today)  Rylan Gutierrez had normal genetic screening with her obstetrician. Patient was counseled on the findings. Questions and concerns were addressed. post josh ultrasound of the renal system    A total of 15 minutes was spent on this visit reviewing previous notes, counseling the patient and documenting the findings in the note. Follow-up  ========    Follow up in 4 weeks for fetal growth.

## 2023-09-27 ENCOUNTER — ROUTINE PRENATAL (OUTPATIENT)
Age: 38
End: 2023-09-27

## 2023-09-27 VITALS
DIASTOLIC BLOOD PRESSURE: 82 MMHG | BODY MASS INDEX: 322.78 KG/M2 | SYSTOLIC BLOOD PRESSURE: 125 MMHG | WEIGHT: 222.2 LBS

## 2023-09-27 DIAGNOSIS — Z34.80 ENCOUNTER FOR SUPERVISION OF OTHER NORMAL PREGNANCY, UNSPECIFIED TRIMESTER: Primary | ICD-10-CM

## 2023-09-27 PROCEDURE — 0502F SUBSEQUENT PRENATAL CARE: CPT | Performed by: OBSTETRICS & GYNECOLOGY

## 2023-09-27 NOTE — PROGRESS NOTES
Pelvic discomfort  Reflux discussed  Wants PP tubal--refer to [facility]

## 2023-10-11 ENCOUNTER — ROUTINE PRENATAL (OUTPATIENT)
Age: 38
End: 2023-10-11

## 2023-10-11 VITALS — BODY MASS INDEX: 323.94 KG/M2 | SYSTOLIC BLOOD PRESSURE: 124 MMHG | WEIGHT: 223 LBS | DIASTOLIC BLOOD PRESSURE: 86 MMHG

## 2023-10-11 VITALS — HEART RATE: 90 BPM | DIASTOLIC BLOOD PRESSURE: 71 MMHG | SYSTOLIC BLOOD PRESSURE: 103 MMHG

## 2023-10-11 DIAGNOSIS — O09.522 ADVANCED MATERNAL AGE IN MULTIGRAVIDA, SECOND TRIMESTER: Primary | ICD-10-CM

## 2023-10-11 DIAGNOSIS — Z34.80 ENCOUNTER FOR SUPERVISION OF OTHER NORMAL PREGNANCY, UNSPECIFIED TRIMESTER: Primary | ICD-10-CM

## 2023-10-11 PROCEDURE — 0502F SUBSEQUENT PRENATAL CARE: CPT | Performed by: OBSTETRICS & GYNECOLOGY

## 2023-10-11 RX ORDER — BUTALBITAL, ACETAMINOPHEN AND CAFFEINE 50; 325; 40 MG/1; MG/1; MG/1
1 TABLET ORAL EVERY 6 HOURS PRN
Qty: 30 TABLET | Refills: 1 | Status: SHIPPED | OUTPATIENT
Start: 2023-10-11

## 2023-10-11 NOTE — PROCEDURES
PATIENT: Mikie Epp   -  : 1985   -  DOS:10/11/2023   -  INTERPRETING PROVIDER:Toan Looney,   Indication  ========    Advanced maternal age subopt views, obesity    Method  ======    Transabdominal ultrasound examination. View: Suboptimal view: limited by late gestational age    Pregnancy  =========    Cuevas pregnancy. Number of fetuses: 1    Dating  ======    Previous Ultrasound on: 3/17/2023  Type of prior assessment: GA  GA at prior assessment date 5 w + 4 d  GA by previous U/S 28 w + 2 d  RODDY by previous Ultrasound: 2023  Ultrasound examination on: 10/11/2023  GA by U/S based upon: [facility], BPD, Femur, HC  GA by U/S 28 w + 5 d  RODDY by U/S: 11/10/2023  Assigned: based on ultrasound (GA), selected on 2023  Assigned GA 35 w + 2 d  Assigned RODDY: 2023    Fetal Biometry  ============    Standard  BPD 88.8 mm 35w 6d 72% Hadlock  .3 mm 37w 2d 85% Andrey  .8 mm 35w 4d 24% Hadlock  .6 mm 35w 2d 58% Hadlock  Femur 70.5 mm 36w 1d 66% Hadlock  Humerus 56.9 mm 33w 1d 15% Andrey  EFW 2,724 g 35w 4d 57% Hadlock  EFW (lb) 6 lb  EFW (oz) 0 oz  EFW by: Hadlock (BPD-HC-AC-FL)  Other Structures   bpm    General Evaluation  ==============    Cardiac activity present.  bpm. Fetal movements: visualized. Presentation: Cephalic  Placenta: Placental site: posterior, no evidence of low lying, posterior  Umbilical cord: Previously documentedCord vessels: 3 vessel cord. Amniotic fluid: MVP 4.3 cm. LUBA 9.8 cm. Q1 2.3 cm, Q2 0.0 cm, Q3 4.3 cm, Q4 3.2 cm    Fetal Anatomy  ===========    Stomach: normal  Kidneys: normal  Bladder: normal  Fetal sex: female  Wants to know fetal sex: yes    Findings  =======    Follow up ultrasound (69509)    Single viable gestation at 35w 2d by clinical dates. Fetal biometry consistent with prior dating. Growth is 2724 g at 57%, abdominal circumference at 58%. Anatomy appears normal as noted above. Cephalic presentation.     LUBA

## 2023-10-17 ENCOUNTER — ROUTINE PRENATAL (OUTPATIENT)
Age: 38
End: 2023-10-17

## 2023-10-17 VITALS
SYSTOLIC BLOOD PRESSURE: 125 MMHG | BODY MASS INDEX: 35.84 KG/M2 | HEIGHT: 66 IN | HEART RATE: 100 BPM | DIASTOLIC BLOOD PRESSURE: 81 MMHG

## 2023-10-17 DIAGNOSIS — Z3A.36 36 WEEKS GESTATION OF PREGNANCY: ICD-10-CM

## 2023-10-17 DIAGNOSIS — O09.523 AMA (ADVANCED MATERNAL AGE) MULTIGRAVIDA 35+, THIRD TRIMESTER: Primary | ICD-10-CM

## 2023-10-17 NOTE — PROCEDURES
PATIENT: Anaid Hodge   -  : 1985   -  DOS:10/17/2023   -  INTERPRETING PROVIDER:Timi Jimenez,   Indication  ========    Advanced maternal age subopt views, obesity    Method  ======    Transabdominal ultrasound examination. View: Suboptimal view: limited by maternal body habitus. Suboptimal view: limited by late gestational age    Pregnancy  =========    lu pregnancy. Number of fetuses: 1    Dating  ======    Previous Ultrasound on: 3/17/2023  Type of prior assessment: GA  GA at prior assessment date 5 w + 4 d  GA by previous U/S 39 w + 1 d  RODDY by previous Ultrasound: 2023  Assigned: based on ultrasound (GA), selected on 2023  Assigned GA 36 w + 1 d  Assigned RODDY: 2023    General Evaluation  ==============    Cardiac activity present.  bpm. Fetal movements: visualized. Presentation: Cephalic  Placenta: Placental site: posterior, no evidence of low lying  Umbilical cord: Previously documented Cord vessels: 3 vessel cord. Amniotic Fluid Assessment  =====================    Amount of AF: normal  MVP 4.3 cm. LUBA 13.5 cm. Q1 3.6 cm, Q2 1.9 cm, Q3 4.3 cm, Q4 3.8 cm    Biophysical Profile  ==============    2: Fetal breathing movements  2: Gross body movements  2: Fetal tone  2: Amniotic fluid volume  8/8 Biophysical profile score    Findings  =======    Biophysical Profile without NST [id number]    Single viable gestation at 36w 1d by clinical dates. Cephalic presentation. Biophysical profile score is 8/8. Amniotic fluid is 13.5 cm. Placenta is posterior, no evidence of low lying. Examination was technically difficult due to maternal body habitus. Not all anatomic structures can be optimally visualized today and may not be able to be seen during  this pregnancy. Ultrasound cannot detect all fetal abnormalities. Plan of Care  ==========    Advanced maternal age  Priscila Chan had normal genetic screening with her obstetrician.     Patient was counseled on the

## 2023-10-18 ENCOUNTER — ROUTINE PRENATAL (OUTPATIENT)
Age: 38
End: 2023-10-18

## 2023-10-18 VITALS
HEART RATE: 101 BPM | SYSTOLIC BLOOD PRESSURE: 113 MMHG | BODY MASS INDEX: 36 KG/M2 | DIASTOLIC BLOOD PRESSURE: 76 MMHG | WEIGHT: 224 LBS

## 2023-10-18 DIAGNOSIS — Z34.80 ENCOUNTER FOR SUPERVISION OF OTHER NORMAL PREGNANCY, UNSPECIFIED TRIMESTER: Primary | ICD-10-CM

## 2023-10-18 PROCEDURE — 0502F SUBSEQUENT PRENATAL CARE: CPT | Performed by: STUDENT IN AN ORGANIZED HEALTH CARE EDUCATION/TRAINING PROGRAM

## 2023-10-20 LAB — GP B STREP DNA SPEC QL NAA+PROBE: NEGATIVE

## 2023-10-24 ENCOUNTER — ROUTINE PRENATAL (OUTPATIENT)
Age: 38
End: 2023-10-24

## 2023-10-24 VITALS — DIASTOLIC BLOOD PRESSURE: 86 MMHG | WEIGHT: 226.6 LBS | BODY MASS INDEX: 36.42 KG/M2 | SYSTOLIC BLOOD PRESSURE: 136 MMHG

## 2023-10-24 VITALS — DIASTOLIC BLOOD PRESSURE: 82 MMHG | HEART RATE: 81 BPM | SYSTOLIC BLOOD PRESSURE: 125 MMHG

## 2023-10-24 DIAGNOSIS — E66.9 CLASS 2 OBESITY: ICD-10-CM

## 2023-10-24 DIAGNOSIS — Z34.80 ENCOUNTER FOR SUPERVISION OF OTHER NORMAL PREGNANCY, UNSPECIFIED TRIMESTER: Primary | ICD-10-CM

## 2023-10-24 DIAGNOSIS — Z3A.37 37 WEEKS GESTATION OF PREGNANCY: Primary | ICD-10-CM

## 2023-10-24 DIAGNOSIS — O09.523 MULTIGRAVIDA OF ADVANCED MATERNAL AGE IN THIRD TRIMESTER: ICD-10-CM

## 2023-10-24 PROBLEM — E66.812 CLASS 2 OBESITY: Status: ACTIVE | Noted: 2023-10-24

## 2023-10-24 PROCEDURE — 0502F SUBSEQUENT PRENATAL CARE: CPT | Performed by: OBSTETRICS & GYNECOLOGY

## 2023-10-24 NOTE — PROGRESS NOTES
Patient is miserable with back pain and contractions off and on  Unsure of position on exam today.   US at [facility] this PM  RTO Friday for follow up

## 2023-10-24 NOTE — PROCEDURES
PATIENT: Jamel Villareal   -  : 1985   -  DOS:10/24/2023   -  INTERPRETING PROVIDER:Berenice Caldera,   Indication  ========    Advanced maternal age subopt views, obesity    Method  ======    Transabdominal ultrasound examination. View: Sufficient    Pregnancy  =========    lu pregnancy. Number of fetuses: 1    Dating  ======    Previous Ultrasound on: 3/17/2023  Type of prior assessment: GA  GA at prior assessment date 5 w + 4 d  GA by previous U/S 40 w + 1 d  RODDY by previous Ultrasound: 2023  Assigned: based on ultrasound (GA), selected on 2023  Assigned GA 40 w + 1 d  Assigned RODDY: 2023    General Evaluation  ==============    Cardiac activity present.  bpm. Fetal movements: visualized. Presentation: Cephalic  Placenta: Placental site: posterior  Umbilical cord: Previously documented Cord vessels: 3 vessel cord. Amniotic Fluid Assessment  =====================    MVP 5.9 cm. LUBA 11.3 cm. Q1 2.4 cm, Q2 0.0 cm, Q3 5.9 cm, Q4 3.0 cm    Biophysical Profile  ==============    2: Fetal breathing movements  2: Gross body movements  2: Fetal tone  2: Amniotic fluid volume  8/8 Biophysical profile score    Findings  =======    Biophysical Profile without NST [id number]    lu pregnancy at 37w 1d by clinical dates. Cephalic presentation. Biophysical profile score is 8/8. Amniotic fluid is normal.  Placenta is posterior. Plan of Care  ==========     Faby #[id number] was used to conduct this visit. Advanced maternal age  Edil Wren had normal genetic screening with her obstetrician. Patient was counseled on the findings. Questions and concerns were addressed.      ultrasound of the renal system recommended for an echo seen in the bladder suggestive of a ureterocele on 23 scan which raised the question of a  duplicated collecting system    A total of 15 minutes was spent on this visit reviewing previous notes, counseling the patient via

## 2023-10-27 ENCOUNTER — ROUTINE PRENATAL (OUTPATIENT)
Age: 38
End: 2023-10-27

## 2023-10-27 VITALS — DIASTOLIC BLOOD PRESSURE: 88 MMHG | WEIGHT: 226 LBS | BODY MASS INDEX: 36.32 KG/M2 | SYSTOLIC BLOOD PRESSURE: 146 MMHG

## 2023-10-27 DIAGNOSIS — Z34.80 ENCOUNTER FOR SUPERVISION OF OTHER NORMAL PREGNANCY, UNSPECIFIED TRIMESTER: Primary | ICD-10-CM

## 2023-10-27 PROCEDURE — 0502F SUBSEQUENT PRENATAL CARE: CPT | Performed by: OBSTETRICS & GYNECOLOGY

## 2023-10-30 NOTE — PROGRESS NOTES
Azeb Davalos is a 43 y.o. female presents for a problem visit. No chief complaint on file. Problems:  victor        Patient's last menstrual period was 02/06/2023. Birth Control: none. Last Pap: normal obtained 8 months  ago. 1. Have you been to the ER, urgent care clinic, or hospitalized since your last visit? No    2. Have you seen or consulted any other health care providers outside of the [address] [place] [address] since your last visit? No        Chart reviewed for the following:   I, Seng Meng LPN, have reviewed the History, Physical and updated the Allergic reactions for Eat Your Kimchi performed immediately prior to start of procedure:   Serina Carmona LPN, have performed the following reviews on Azeb Davalos prior to the start of the procedure:            * Patient was identified by name and date of birth   * Agreement on procedure being performed was verified  * Risks and Benefits explained to the patient  * Procedure site verified and marked as necessary  * Patient was positioned for comfort  * Consent was signed and verified     Time: 11:30am    Date of procedure: 10/30/2023    Procedure performed by: Junaid Goodman MD       Provider assisted by: AS LPN    Patient assisted by: self    How tolerated by patient: tolerated the procedure well with no complications    Post Procedural Pain Scale: 0 - No Hurt    Comments: none    Examination chaperoned by Seng Meng LPN.

## 2023-10-31 ENCOUNTER — ROUTINE PRENATAL (OUTPATIENT)
Age: 38
End: 2023-10-31

## 2023-10-31 ENCOUNTER — HOSPITAL ENCOUNTER (INPATIENT)
Facility: HOSPITAL | Age: 38
LOS: 2 days | Discharge: HOME | DRG: 560 | End: 2023-11-02
Attending: OBSTETRICS & GYNECOLOGY | Admitting: OBSTETRICS & GYNECOLOGY
Payer: MEDICAID

## 2023-10-31 VITALS — SYSTOLIC BLOOD PRESSURE: 145 MMHG | BODY MASS INDEX: 36.36 KG/M2 | WEIGHT: 226.2 LBS | DIASTOLIC BLOOD PRESSURE: 93 MMHG

## 2023-10-31 DIAGNOSIS — Z34.80 ENCOUNTER FOR SUPERVISION OF OTHER NORMAL PREGNANCY, UNSPECIFIED TRIMESTER: Primary | ICD-10-CM

## 2023-10-31 PROBLEM — O13.3 GESTATIONAL HYPERTENSION W/O SIGNIFICANT PROTEINURIA IN 3RD TRIMESTER: Status: ACTIVE | Noted: 2023-10-31

## 2023-10-31 LAB
ERYTHROCYTE [DISTWIDTH] IN BLOOD BY AUTOMATED COUNT: 14.3 % (ref 11.5–14.5)
HCT VFR BLD AUTO: 33.3 % (ref 35–47)
HGB BLD-MCNC: 10.4 G/DL (ref 11.5–16)
MCH RBC QN AUTO: 24.9 PG (ref 26–34)
MCHC RBC AUTO-ENTMCNC: 31.2 G/DL (ref 30–36.5)
MCV RBC AUTO: 79.7 FL (ref 80–99)
NRBC # BLD: 0 K/UL (ref 0–0.01)
NRBC BLD-RTO: 0 PER 100 WBC
PLATELET # BLD AUTO: 322 K/UL (ref 150–400)
PMV BLD AUTO: 10.7 FL (ref 8.9–12.9)
RBC # BLD AUTO: 4.18 M/UL (ref 3.8–5.2)
WBC # BLD AUTO: 11.5 K/UL (ref 3.6–11)

## 2023-10-31 PROCEDURE — 6370000000 HC RX 637 (ALT 250 FOR IP): Performed by: ADVANCED PRACTICE MIDWIFE

## 2023-10-31 PROCEDURE — 7220000101 HC DELIVERY VAGINAL/SINGLE: Performed by: ADVANCED PRACTICE MIDWIFE

## 2023-10-31 PROCEDURE — 1100000000 HC RM PRIVATE

## 2023-10-31 PROCEDURE — 36415 COLL VENOUS BLD VENIPUNCTURE: CPT

## 2023-10-31 PROCEDURE — 59200 INSERT CERVICAL DILATOR: CPT | Performed by: ADVANCED PRACTICE MIDWIFE

## 2023-10-31 PROCEDURE — 86901 BLOOD TYPING SEROLOGIC RH(D): CPT

## 2023-10-31 PROCEDURE — 86900 BLOOD TYPING SEROLOGIC ABO: CPT

## 2023-10-31 PROCEDURE — 6370000000 HC RX 637 (ALT 250 FOR IP): Performed by: OBSTETRICS & GYNECOLOGY

## 2023-10-31 PROCEDURE — 3E033VJ INTRODUCTION OF OTHER HORMONE INTO PERIPHERAL VEIN, PERCUTANEOUS APPROACH: ICD-10-PCS | Performed by: OBSTETRICS & GYNECOLOGY

## 2023-10-31 PROCEDURE — 86850 RBC ANTIBODY SCREEN: CPT

## 2023-10-31 PROCEDURE — 7210000100 HC LABOR FEE PER 1 HR: Performed by: ADVANCED PRACTICE MIDWIFE

## 2023-10-31 PROCEDURE — 6360000002 HC RX W HCPCS: Performed by: OBSTETRICS & GYNECOLOGY

## 2023-10-31 PROCEDURE — 3E0DXGC INTRODUCTION OF OTHER THERAPEUTIC SUBSTANCE INTO MOUTH AND PHARYNX, EXTERNAL APPROACH: ICD-10-PCS | Performed by: OBSTETRICS & GYNECOLOGY

## 2023-10-31 PROCEDURE — 2580000003 HC RX 258: Performed by: OBSTETRICS & GYNECOLOGY

## 2023-10-31 PROCEDURE — 85027 COMPLETE CBC AUTOMATED: CPT

## 2023-10-31 RX ORDER — DOCUSATE SODIUM 100 MG/1
100 CAPSULE, LIQUID FILLED ORAL 2 TIMES DAILY
Status: DISCONTINUED | OUTPATIENT
Start: 2023-10-31 | End: 2023-11-02 | Stop reason: HOSPADM

## 2023-10-31 RX ORDER — SODIUM CHLORIDE 0.9 % (FLUSH) 0.9 %
5-40 SYRINGE (ML) INJECTION PRN
Status: DISCONTINUED | OUTPATIENT
Start: 2023-10-31 | End: 2023-11-01

## 2023-10-31 RX ORDER — SODIUM CHLORIDE, SODIUM LACTATE, POTASSIUM CHLORIDE, AND CALCIUM CHLORIDE .6; .31; .03; .02 G/100ML; G/100ML; G/100ML; G/100ML
1000 INJECTION, SOLUTION INTRAVENOUS PRN
Status: DISCONTINUED | OUTPATIENT
Start: 2023-10-31 | End: 2023-11-02 | Stop reason: HOSPADM

## 2023-10-31 RX ORDER — METHYLERGONOVINE MALEATE 0.2 MG/ML
200 INJECTION INTRAVENOUS PRN
Status: DISCONTINUED | OUTPATIENT
Start: 2023-10-31 | End: 2023-11-02 | Stop reason: HOSPADM

## 2023-10-31 RX ORDER — SODIUM CHLORIDE 9 MG/ML
INJECTION, SOLUTION INTRAVENOUS PRN
Status: DISCONTINUED | OUTPATIENT
Start: 2023-10-31 | End: 2023-11-02 | Stop reason: HOSPADM

## 2023-10-31 RX ORDER — SODIUM CHLORIDE, SODIUM LACTATE, POTASSIUM CHLORIDE, CALCIUM CHLORIDE 600; 310; 30; 20 MG/100ML; MG/100ML; MG/100ML; MG/100ML
INJECTION, SOLUTION INTRAVENOUS CONTINUOUS
Status: DISCONTINUED | OUTPATIENT
Start: 2023-10-31 | End: 2023-11-02 | Stop reason: HOSPADM

## 2023-10-31 RX ORDER — LANOLIN/MINERAL OIL
LOTION (ML) TOPICAL PRN
Status: DISCONTINUED | OUTPATIENT
Start: 2023-10-31 | End: 2023-11-02 | Stop reason: HOSPADM

## 2023-10-31 RX ORDER — SODIUM CHLORIDE, SODIUM LACTATE, POTASSIUM CHLORIDE, CALCIUM CHLORIDE 600; 310; 30; 20 MG/100ML; MG/100ML; MG/100ML; MG/100ML
INJECTION, SOLUTION INTRAVENOUS CONTINUOUS
Status: DISCONTINUED | OUTPATIENT
Start: 2023-10-31 | End: 2023-11-01

## 2023-10-31 RX ORDER — IBUPROFEN 600 MG/1
600 TABLET ORAL EVERY 6 HOURS
Status: DISCONTINUED | OUTPATIENT
Start: 2023-10-31 | End: 2023-11-02 | Stop reason: HOSPADM

## 2023-10-31 RX ORDER — SODIUM CHLORIDE 0.9 % (FLUSH) 0.9 %
5-40 SYRINGE (ML) INJECTION EVERY 12 HOURS SCHEDULED
Status: DISCONTINUED | OUTPATIENT
Start: 2023-10-31 | End: 2023-11-02 | Stop reason: HOSPADM

## 2023-10-31 RX ORDER — BUTORPHANOL TARTRATE 1 MG/ML
1 INJECTION, SOLUTION INTRAMUSCULAR; INTRAVENOUS
Status: DISCONTINUED | OUTPATIENT
Start: 2023-10-31 | End: 2023-10-31

## 2023-10-31 RX ORDER — SODIUM CHLORIDE 0.9 % (FLUSH) 0.9 %
5-40 SYRINGE (ML) INJECTION PRN
Status: DISCONTINUED | OUTPATIENT
Start: 2023-10-31 | End: 2023-11-02 | Stop reason: HOSPADM

## 2023-10-31 RX ORDER — SODIUM CHLORIDE 0.9 % (FLUSH) 0.9 %
5-40 SYRINGE (ML) INJECTION EVERY 12 HOURS SCHEDULED
Status: DISCONTINUED | OUTPATIENT
Start: 2023-10-31 | End: 2023-11-01

## 2023-10-31 RX ORDER — ONDANSETRON 2 MG/ML
4 INJECTION INTRAMUSCULAR; INTRAVENOUS EVERY 6 HOURS PRN
Status: DISCONTINUED | OUTPATIENT
Start: 2023-10-31 | End: 2023-11-02 | Stop reason: HOSPADM

## 2023-10-31 RX ORDER — SODIUM CHLORIDE, SODIUM LACTATE, POTASSIUM CHLORIDE, AND CALCIUM CHLORIDE .6; .31; .03; .02 G/100ML; G/100ML; G/100ML; G/100ML
500 INJECTION, SOLUTION INTRAVENOUS PRN
Status: DISCONTINUED | OUTPATIENT
Start: 2023-10-31 | End: 2023-11-02 | Stop reason: HOSPADM

## 2023-10-31 RX ORDER — ACETAMINOPHEN 500 MG
1000 TABLET ORAL EVERY 8 HOURS SCHEDULED
Status: DISCONTINUED | OUTPATIENT
Start: 2023-10-31 | End: 2023-11-02 | Stop reason: HOSPADM

## 2023-10-31 RX ORDER — SODIUM CHLORIDE 9 MG/ML
25 INJECTION, SOLUTION INTRAVENOUS PRN
Status: DISCONTINUED | OUTPATIENT
Start: 2023-10-31 | End: 2023-11-01

## 2023-10-31 RX ORDER — CALCIUM CARBONATE 500 MG/1
1 TABLET, CHEWABLE ORAL DAILY
COMMUNITY

## 2023-10-31 RX ORDER — BUTALBITAL, ACETAMINOPHEN AND CAFFEINE 50; 325; 40 MG/1; MG/1; MG/1
1 TABLET ORAL EVERY 4 HOURS PRN
COMMUNITY

## 2023-10-31 RX ORDER — CARBOPROST TROMETHAMINE 250 UG/ML
250 INJECTION, SOLUTION INTRAMUSCULAR PRN
Status: DISCONTINUED | OUTPATIENT
Start: 2023-10-31 | End: 2023-11-02 | Stop reason: HOSPADM

## 2023-10-31 RX ORDER — MISOPROSTOL 200 UG/1
800 TABLET ORAL PRN
Status: DISCONTINUED | OUTPATIENT
Start: 2023-10-31 | End: 2023-11-02 | Stop reason: HOSPADM

## 2023-10-31 RX ORDER — TRANEXAMIC ACID 10 MG/ML
1000 INJECTION, SOLUTION INTRAVENOUS
Status: ACTIVE | OUTPATIENT
Start: 2023-10-31 | End: 2023-11-01

## 2023-10-31 RX ADMIN — OXYTOCIN 87.3 MILLI-UNITS/MIN: 10 INJECTION INTRAVENOUS at 22:31

## 2023-10-31 RX ADMIN — IBUPROFEN 600 MG: 600 TABLET, FILM COATED ORAL at 23:28

## 2023-10-31 RX ADMIN — BUTORPHANOL TARTRATE 1 MG: 2 INJECTION, SOLUTION INTRAMUSCULAR; INTRAVENOUS at 14:44

## 2023-10-31 RX ADMIN — Medication 50 MCG: at 20:18

## 2023-10-31 RX ADMIN — ACETAMINOPHEN 1000 MG: 500 TABLET ORAL at 23:57

## 2023-10-31 ASSESSMENT — PAIN DESCRIPTION - ORIENTATION: ORIENTATION: LOWER

## 2023-10-31 ASSESSMENT — PAIN DESCRIPTION - DESCRIPTORS: DESCRIPTORS: CRAMPING

## 2023-10-31 ASSESSMENT — PAIN DESCRIPTION - LOCATION: LOCATION: ABDOMEN

## 2023-10-31 NOTE — PROGRESS NOTES
Cervical Catheter insertion procedure note    Guadalupe Nelson is a [id number],  43 y.o. female who presents today far placement of a cervical catheter in the cervix for ripening. Her cervix is unfavorable and she is scheduled for induction tomorrow. She has elected to have a cervical catheter placement today. The risks, benefits and assets of the procedure were discussed. Her questions were answered. PROCEDURE: The vagina and cervix was prepped with Zephiran. A Cook catheter was placed through the cervix without difficulty. The uterine bulb was inflated with 60 cc of saline. The vaginal balloon was inflated to 40 cc of saline. Bleeding was minimal. The patient's level of discomfort was minimal.   POST PROCEDURE: The patient tolerated the procedure well. There were no complications. She was admitted as an outpatient for monitoring overnight. She was given labor and ROM warnings.

## 2023-10-31 NOTE — PROGRESS NOTES
Iesha report received from ANAMARIA Wagner, RN. Care of PT assumed at this time. 1900 [facility] report given to CARL[address], RN. Care of PT handed off at this time.

## 2023-10-31 NOTE — PROGRESS NOTES
Pt arrived to L&D room 211 for IOL d/t HTN. Cook catheter in place. 1515: Bedside and Verbal shift change report given to NICHOLE Starks (oncoming nurse) by ANAMARIA Hair RN (offgoing nurse). Report included the following information Nurse Handoff Report, Adult Overview, Intake/Output, MAR, Recent Results, and Med Rec Status.

## 2023-10-31 NOTE — PROGRESS NOTES
1900: Bedside and Verbal shift change report given to Gerda Russell RN (oncoming nurse) by Shadi Mackenzie RN (offgoing nurse). Report included the following information Nurse Handoff Report, Index, Adult Overview, Surgery Report, Intake/Output, MAR, Recent Results, Med Rec Status, and Procedure Verification. 2201: SROM at this time. Nitrazine positive. 2206: This RN calling Carole Mosher CNM at this time. 2217: Patient actively pushing. RN remains in continuous attendance at the bedside. Assessment & evaluation of fetal heart rate ongoing via continuous EFM. 2227: RN and CNM remained at bedside throughout pushing. EFM continuously assessed. Vaginal delivery of viable infant. Delivery QBL: 150 EBL at delivery per CNM  2 hour QBL: 80  Total: 230    0120: TRANSFER - OUT REPORT:    Verbal report given to MCKINLEY Moctezuma on Emley Gregory  being transferred to MIU for routine progression of patient care       Report consisted of patient's Situation, Background, Assessment and   Recommendations(SBAR). Information from the following report(s) Nurse Handoff Report, Index, Adult Overview, Surgery Report, Intake/Output, MAR, Recent Results, Med Rec Status, Alarm Parameters, and Quality Measures was reviewed with the receiving nurse. Lines:   Peripheral IV 10/31/23 Right Forearm (Active)   Site Assessment Clean, dry & intact 11/01/23 0130   Line Status Normal saline locked 11/01/23 0130   Line Care Connections checked and tightened 11/01/23 0130   Phlebitis Assessment No symptoms 11/01/23 0130   Infiltration Assessment 0 11/01/23 0130   Alcohol Cap Used Yes 11/01/23 0130   Dressing Status Clean, dry & intact 11/01/23 0130   Dressing Type Transparent 11/01/23 0130        Opportunity for questions and clarification was provided. Patient transported with:  Registered Nurse    RN offering to perform fundal with MIU nurse. RN declining at this time.

## 2023-10-31 NOTE — H&P
History & Physical    Name: Mayra Santillan MRN: [id number]  SSN: [id number]    YOB: 1985  Age: 43 y.o. Sex: female        Subjective:     Estimated Date of Delivery: 23  OB History          3    Para   2    Term   0       2    AB   0    Living   2         SAB   0    IAB   0    Ectopic   0    Molar   0    Multiple   0    Live Births   2                Ms. Radha Pretty is admitted with pregnancy at 38w1d for induction of labor. Prenatal course was complicated by:  Hypertension recently  AMA--followed by [facility]  H/O SGA in second pregnancy  H/O of PIH in first pregnancy--on ASA 81. Her induction is indicated for hypertension. Group B Strep was negative. Past Medical History:   Diagnosis Date    Asthma     uses inhaler-4 months ago    Gestational hypertension     both pregnancy (pre-eclampsia with first; on Mg postpartum)    Hypertension      No past surgical history on file. Social History     Occupational History    Not on file   Tobacco Use    Smoking status: Never    Smokeless tobacco: Never   Vaping Use    Vaping Use: Never used   Substance and Sexual Activity    Alcohol use: No    Drug use: No    Sexual activity: Yes     Partners: Male     Birth control/protection: None     Family History   Problem Relation Age of Onset    High Blood Pressure Mother     No Known Problems Father        No Known Allergies  Prior to Admission medications    Medication Sig Start Date End Date Taking? Authorizing Provider   butalbital-acetaminophen-caffeine (FIORICET, ESGIC) -17 MG per tablet Take 1 tablet by mouth every 6 hours as needed for Headaches  Patient not taking: Reported on 10/27/2023 10/11/23   Amari Barba MD   Prenatal Vit w/Xf-Poktdqyjk-ZW (PNV PO) Take by mouth    ProviderAmy MD        Review of Systems: Pertinent items are noted in HPI. Objective:     Vitals: There were no vitals filed for this visit.      Constitutional  Appearance:

## 2023-10-31 NOTE — CONSULTS
Pt here for PAT visit.  Pre-op tests completed, chg soap given, and instructions reviewed.  Instructed clears until 5:30 am dos, voiced understanding.  COVID swab completed and pt agreeable to self-quarantine until dos.   Session ID: [id number]  Language: Upper sorbian   ID: #[id number]   Name: Radha Kwon

## 2023-11-01 LAB
ABO + RH BLD: NORMAL
BLOOD GROUP ANTIBODIES SERPL: NORMAL
ERYTHROCYTE [DISTWIDTH] IN BLOOD BY AUTOMATED COUNT: 14.6 % (ref 11.5–14.5)
HCT VFR BLD AUTO: 33.3 % (ref 35–47)
HGB BLD-MCNC: 10.4 G/DL (ref 11.5–16)
MCH RBC QN AUTO: 25.1 PG (ref 26–34)
MCHC RBC AUTO-ENTMCNC: 31.2 G/DL (ref 30–36.5)
MCV RBC AUTO: 80.2 FL (ref 80–99)
NRBC # BLD: 0 K/UL (ref 0–0.01)
NRBC BLD-RTO: 0 PER 100 WBC
PLATELET # BLD AUTO: 318 K/UL (ref 150–400)
PMV BLD AUTO: 10.5 FL (ref 8.9–12.9)
RBC # BLD AUTO: 4.15 M/UL (ref 3.8–5.2)
SPECIMEN EXP DATE BLD: NORMAL
WBC # BLD AUTO: 17.1 K/UL (ref 3.6–11)

## 2023-11-01 PROCEDURE — 85027 COMPLETE CBC AUTOMATED: CPT

## 2023-11-01 PROCEDURE — 36415 COLL VENOUS BLD VENIPUNCTURE: CPT

## 2023-11-01 PROCEDURE — 6370000000 HC RX 637 (ALT 250 FOR IP): Performed by: ADVANCED PRACTICE MIDWIFE

## 2023-11-01 PROCEDURE — 1120000000 HC RM PRIVATE OB

## 2023-11-01 RX ADMIN — IBUPROFEN 600 MG: 600 TABLET, FILM COATED ORAL at 05:02

## 2023-11-01 RX ADMIN — ACETAMINOPHEN 1000 MG: 500 TABLET ORAL at 08:34

## 2023-11-01 RX ADMIN — IBUPROFEN 600 MG: 600 TABLET, FILM COATED ORAL at 13:39

## 2023-11-01 RX ADMIN — IBUPROFEN 600 MG: 600 TABLET, FILM COATED ORAL at 20:15

## 2023-11-01 RX ADMIN — DOCUSATE SODIUM 100 MG: 100 CAPSULE, LIQUID FILLED ORAL at 08:33

## 2023-11-01 RX ADMIN — DOCUSATE SODIUM 100 MG: 100 CAPSULE, LIQUID FILLED ORAL at 20:15

## 2023-11-01 ASSESSMENT — PAIN DESCRIPTION - DESCRIPTORS
DESCRIPTORS: CRAMPING
DESCRIPTORS: ACHING;SORE

## 2023-11-01 ASSESSMENT — PAIN - FUNCTIONAL ASSESSMENT: PAIN_FUNCTIONAL_ASSESSMENT: ACTIVITIES ARE NOT PREVENTED

## 2023-11-01 ASSESSMENT — PAIN SCALES - GENERAL
PAINLEVEL_OUTOF10: 2
PAINLEVEL_OUTOF10: 2

## 2023-11-01 ASSESSMENT — PAIN DESCRIPTION - ORIENTATION: ORIENTATION: LOWER

## 2023-11-01 ASSESSMENT — PAIN DESCRIPTION - LOCATION
LOCATION: ABDOMEN;PERINEUM;GENERALIZED
LOCATION: ABDOMEN

## 2023-11-01 NOTE — CARE COORDINATION
11/1/2023  3:17 PM    CM met with AMAURY to complete initial assessment and begin discharge planning. MOB verified and confirmed demographics. AMAURY lives with family, at the address on file. AMAURY reports she has good family support, and feels like she has the support she needs when she returns home. AMAURY plans to breast feed baby. Moshe Acharya will provide follow up care for infant. AMAURY has car seat, bassinet/crib, clothing, bottles and all necessary supplies for baby. AMAURY has [employer], and will be adding baby to this policy. CM discussed process to add baby to insurance, AMAURY verbalized understanding. 11/01/23 [id number]   Service Assessment   Patient Orientation Alert and Oriented   Cognition Alert   History Provided By Patient   Primary Caregiver Self   Support Systems Spouse/Significant Other   PCP Verified by CM Yes   Last Visit to PCP Within last 3 months   Prior Functional Level Independent in ADLs/IADLs   Current Functional Level Independent in ADLs/IADLs   Can patient return to prior living arrangement Yes   Ability to make needs known: Good   Family able to assist with home care needs: Yes   Would you like for me to discuss the discharge plan with any other family members/significant others, and if so, who?  No   Financial Resources [employer]     Alex Gama

## 2023-11-01 NOTE — LACTATION NOTE
This note was copied from a baby's chart. Mother states she breast fed her second baby for 1 month and her new baby has been latching on and breastfeeding well. Discussed with mother her plan for feeding. Reviewed the benefits of exclusive breast milk feeding during the hospital stay. Informed her of the risks of using formula to supplement in the first few days of life as well as the benefits of successful breast milk feeding; referred her to the Breastfeeding booklet about this information. She acknowledges understanding of information reviewed and states that it is her plan to breastfeed her infant. Will support her choice and offer additional information as needed. Encouraged mom to attempt feeding with baby led feeding cues. Just as sucking on fingers, rooting, mouthing. Looking for 8-12 feedings in 24 hours. Don't limit baby at breast, allow baby to come of breast on it's own. Baby may want to feed  often and may increase number of feedings on second day of life. Skin to skin encouraged. If baby doesn't nurse,  Mom should  hand express  10-20 drops of colostrum and drip into baby's mouth, or give to baby by finger feeding, cup feeding, or spoon feeding at least every 2-3 hours. Discussed eating a healthy diet. Instructed mother to eat a variety of foods in order to get a well balanced diet. She should consume an extra 500 calories per day (more than her non-pregnant requirement.) These extra calories will help provide energy needed for optimal breast milk production. Mother also encouraged to \"drink to thirst\" and it is recommended that she drink fluids such as water, fruit/vegetable juice. Nutritious snacks should be available so that she can eat throughout the day to help satisfy her hunger and maintain a good milk supply. [employer] hand pump given with instructions for use given. Reviewed storage and preparation of expressed breast milk for baby.     Mother will successfully

## 2023-11-01 NOTE — L&D DELIVERY NOTE
CNM Delivery Note       Patient: Aminta Gaffney MRN: [id number]  SSN: [id number]    YOB: 1985  Age: 43 y.o. Sex: female        RN called this provider stating that the SROM at 22:04 - clear fluid. Upon entering the room, pt reports to me having strong urges to push. SVE at 22:20  was  9-9.5 cm /100%/-2. Pt instinctively started pushing. Pt was complete cervical dilation at 22:21. Patient under no anesthesia  in semi fowlers position.  of a live Baby Girl \" Marlene\"  at 22:27 with Apgars 9, 9 in MARGARET position. Shoulders spontaneous, easily delivered with maternal effort. Vigorous  placed on maternal abdomen immediately following delivery. Umbilical cord cut after 2 minutes of life. Placenta and membranes spontaneous, intact, with 3 vessel cord via [employer] mechanism at 22:31. Fundus massaged to firm. Estimated blood loss 150 mL. Vagina and perineum inspected. Perineum intact. No vaginal laceration noted. Mother and infant stable, bonding, pt is wanting to establish breastfeeding. Crissy Saravia, Female Yvette Bulls [[id number]]      Labor Events     Labor: No   Steroids: None  Cervical Ripening Date/Time:      Antibiotics Received during Labor: No  Rupture Date/Time:  10/31/23 22:04:00   Rupture Type: SROM  Fluid Color: Clear  Fluid Odor: None  Fluid Volume: Moderate  Induction: Cervical Ripening Balloon, Misoprostol  Augmentation: None  Labor Complications:  Other (Comment)   Additional Complications:  OB: DELIVERY - COMPLICATIONS   Gestational hypertension             Anesthesia    Method: None       Labor Event Times      Labor onset date/time:        Dilation complete date/time:  10/31/23 22:21:00     Start pushing date/time:  10/31/2023 22:20:00   Decision date/time (emergent ):            Delivery Details      Delivery Date: 10/31/23 Delivery Time: 22:27:00   Delivery Type: Vaginal, Spontaneous               Presentation    Presentation:

## 2023-11-02 VITALS
HEIGHT: 65 IN | OXYGEN SATURATION: 99 % | DIASTOLIC BLOOD PRESSURE: 85 MMHG | BODY MASS INDEX: 37.65 KG/M2 | SYSTOLIC BLOOD PRESSURE: 122 MMHG | WEIGHT: 226 LBS | RESPIRATION RATE: 12 BRPM | HEART RATE: 69 BPM | TEMPERATURE: 98.2 F

## 2023-11-02 PROBLEM — Z34.80 ENCOUNTER FOR SUPERVISION OF OTHER NORMAL PREGNANCY, UNSPECIFIED TRIMESTER: Status: RESOLVED | Noted: 2018-09-04 | Resolved: 2023-11-02

## 2023-11-02 PROBLEM — O13.3 GESTATIONAL HYPERTENSION W/O SIGNIFICANT PROTEINURIA IN 3RD TRIMESTER: Status: RESOLVED | Noted: 2023-10-31 | Resolved: 2023-11-02

## 2023-11-02 PROCEDURE — 6370000000 HC RX 637 (ALT 250 FOR IP): Performed by: ADVANCED PRACTICE MIDWIFE

## 2023-11-02 RX ADMIN — ACETAMINOPHEN 1000 MG: 500 TABLET ORAL at 02:50

## 2023-11-02 RX ADMIN — ACETAMINOPHEN 1000 MG: 500 TABLET ORAL at 10:40

## 2023-11-02 RX ADMIN — DOCUSATE SODIUM 100 MG: 100 CAPSULE, LIQUID FILLED ORAL at 09:20

## 2023-11-02 RX ADMIN — IBUPROFEN 600 MG: 600 TABLET, FILM COATED ORAL at 09:20

## 2023-11-02 RX ADMIN — IBUPROFEN 600 MG: 600 TABLET, FILM COATED ORAL at 02:50

## 2023-11-02 ASSESSMENT — PAIN SCALES - GENERAL
PAINLEVEL_OUTOF10: 1
PAINLEVEL_OUTOF10: 2
PAINLEVEL_OUTOF10: 2

## 2023-11-02 ASSESSMENT — PAIN DESCRIPTION - LOCATION
LOCATION: BACK
LOCATION: ABDOMEN
LOCATION: ABDOMEN

## 2023-11-02 ASSESSMENT — PAIN DESCRIPTION - ORIENTATION
ORIENTATION: LOWER
ORIENTATION: LOWER

## 2023-11-02 ASSESSMENT — PAIN DESCRIPTION - DESCRIPTORS
DESCRIPTORS: SORE
DESCRIPTORS: ACHING
DESCRIPTORS: ACHING

## 2023-11-02 NOTE — LACTATION NOTE
This note was copied from a baby's chart. Mother and baby for discharge. Mother states baby has been nursing well and she is not having any difficulty at this time. Reviewed breastfeeding basics:  Supply and demand,  stomach size, early  Feeding cues, skin to skin, positioning and baby led latch-on, assymetrical latch with signs of good, deep latch vs shallow, feeding frequency and duration, and log sheet for tracking infant feedings and output. Breastfeeding Booklet and Warm line information given. Discussed typical  weight loss and the importance of infant weight checks with pediatrician 1-2 post discharge. Discussed eating a healthy diet. Instructed mother to eat a variety of foods in order to get a well balanced diet. She should consume an extra 500 calories per day (more than her non-pregnant requirement.) These extra calories will help provide energy needed for optimal breast milk production. Mother also encouraged to \"drink to thirst\" and it is recommended that she drink fluids such as water, fruit/vegetable juice. Nutritious snacks should be available so that she can eat throughout the day to help satisfy her hunger and maintain a good milk supply. Discussed what to do if she gets engorged or if her nipples become sore:    Engorgement Care Guidelines:  Reviewed how milk is made and normal phases of milk production. Taught care of engorged breasts - physiologic breastfeeding encouraged with use of cool packs (no ice directly on skin). Consider use of NSAIDS where appropriate for discomfort and inflammation. Can employ light touch, lymphatic drainage techniques on tender grandular tissues. Anticipatory guidance shared.       Care for sore/tender nipples discussed:  ways to improve positioning and latch practiced and discussed, hand express colostrum after feedings and let air dry, light application of lanolin, hydrogel pads, seek comfortable laid back feeding position, start

## 2023-11-02 NOTE — PROGRESS NOTES
Pt off unit, in stable condition by wheel chair with FOB for discharge home per Dr Caesar Perdue. Pt is to follow up in 6 weeks and is aware. Perscriptions given to patient. Patient denies any headache, dizziness, n/v, or pain at this time. Infant in care seat with mother. This RN used  Maame Lester from language services to provide discharge education.

## 2023-11-02 NOTE — DISCHARGE SUMMARY
Obstetrical Discharge Summary     Name: Von Perez MRN: [id number]  SSN: [id number]    YOB: 1985  Age: 43 y.o. Sex: female      Admit Date: 10/31/2023    Discharge Date: 2023     Admitting Physician: Cristhian Barraza MD     Attending Physician:  Cristhian Barraza MD     Admission Diagnoses: Gestational hypertension w/o significant proteinuria in 3rd trimester [O13.3]    Discharge Diagnoses:   Information for the patient's :  Cj Davis [[id number]]   @[id number]@      Additional Diagnoses:  No components found for: \"OBEXTABORH\", \"OBEXTABSCRN\", \"OBEXTRUBELLA\", \"OBEXTGRBS\"    Immunization(s):   Immunization History   Administered Date(s) Administered    Influenza, FLUARIX, FLULAVAL, FLUZONE (age 10 mo+) AND AFLURIA, (age 1 y+), PF, 0.5mL 10/17/2018    TDaP, ADACEL (age 6y-58y), Vidya Cowden (age 10y+), IM, 0.5mL 2019, 2023        Hospital Course: Normal hospital course following the delivery. The patient was released to her home in good condition. Patient Instructions:     Reference my discharge instructions. I spent 10 minutes discharging the patient in face to face contact. No follow-ups on file.      Signed By:  Madina Shepherd MD     2023

## 2024-03-28 ENCOUNTER — HOSPITAL ENCOUNTER (OUTPATIENT)
Facility: HOSPITAL | Age: 39
Setting detail: SPECIMEN
Discharge: HOME | End: 2024-03-31

## 2024-03-28 DIAGNOSIS — M79.652 PAIN IN BOTH THIGHS: ICD-10-CM

## 2024-03-28 DIAGNOSIS — M79.651 PAIN IN BOTH THIGHS: ICD-10-CM

## 2024-03-28 PROCEDURE — 36415 COLL VENOUS BLD VENIPUNCTURE: CPT

## 2024-03-28 PROCEDURE — 80053 COMPREHEN METABOLIC PANEL: CPT

## 2024-03-28 PROCEDURE — 82728 ASSAY OF FERRITIN: CPT

## 2024-03-28 PROCEDURE — 84443 ASSAY THYROID STIM HORMONE: CPT

## 2024-03-28 PROCEDURE — 85027 COMPLETE CBC AUTOMATED: CPT

## 2024-03-28 PROCEDURE — 82306 VITAMIN D 25 HYDROXY: CPT

## 2024-03-28 PROCEDURE — 82607 VITAMIN B-12: CPT

## 2024-03-28 PROCEDURE — 82746 ASSAY OF FOLIC ACID SERUM: CPT

## 2024-03-29 LAB
25(OH)D3 SERPL-MCNC: 10.4 NG/ML (ref 30–100)
ALBUMIN SERPL-MCNC: 3.8 G/DL (ref 3.5–5)
ALBUMIN/GLOB SERPL: 1 (ref 1.1–2.2)
ALP SERPL-CCNC: 116 U/L (ref 45–117)
ALT SERPL-CCNC: 32 U/L (ref 12–78)
ANION GAP SERPL CALC-SCNC: 4 MMOL/L (ref 5–15)
AST SERPL-CCNC: 15 U/L (ref 15–37)
BILIRUB SERPL-MCNC: 0.2 MG/DL (ref 0.2–1)
BUN SERPL-MCNC: 12 MG/DL (ref 6–20)
BUN/CREAT SERPL: 14 (ref 12–20)
CALCIUM SERPL-MCNC: 9.2 MG/DL (ref 8.5–10.1)
CHLORIDE SERPL-SCNC: 110 MMOL/L (ref 97–108)
CO2 SERPL-SCNC: 26 MMOL/L (ref 21–32)
CREAT SERPL-MCNC: 0.85 MG/DL (ref 0.55–1.02)
ERYTHROCYTE [DISTWIDTH] IN BLOOD BY AUTOMATED COUNT: 13.2 % (ref 11.5–14.5)
FERRITIN SERPL-MCNC: 5 NG/ML (ref 26–388)
FOLATE SERPL-MCNC: 8.6 NG/ML (ref 5–21)
GLOBULIN SER CALC-MCNC: 3.8 G/DL (ref 2–4)
GLUCOSE SERPL-MCNC: 88 MG/DL (ref 65–100)
HCT VFR BLD AUTO: 37.5 % (ref 35–47)
HGB BLD-MCNC: 11.4 G/DL (ref 11.5–16)
MCH RBC QN AUTO: 25.2 PG (ref 26–34)
MCHC RBC AUTO-ENTMCNC: 30.4 G/DL (ref 30–36.5)
MCV RBC AUTO: 83 FL (ref 80–99)
NRBC # BLD: 0 K/UL (ref 0–0.01)
NRBC BLD-RTO: 0 PER 100 WBC
PLATELET # BLD AUTO: 351 K/UL (ref 150–400)
PMV BLD AUTO: 10.7 FL (ref 8.9–12.9)
POTASSIUM SERPL-SCNC: 4 MMOL/L (ref 3.5–5.1)
PROT SERPL-MCNC: 7.6 G/DL (ref 6.4–8.2)
RBC # BLD AUTO: 4.52 M/UL (ref 3.8–5.2)
SODIUM SERPL-SCNC: 140 MMOL/L (ref 136–145)
TSH SERPL DL<=0.05 MIU/L-ACNC: 0.87 UIU/ML (ref 0.36–3.74)
VIT B12 SERPL-MCNC: 440 PG/ML (ref 193–986)
WBC # BLD AUTO: 10.8 K/UL (ref 3.6–11)

## 2024-04-15 ENCOUNTER — TELEMEDICINE (OUTPATIENT)
Age: 39
End: 2024-04-15

## 2024-04-15 DIAGNOSIS — D50.0 IRON DEFICIENCY ANEMIA DUE TO CHRONIC BLOOD LOSS: ICD-10-CM

## 2024-04-15 DIAGNOSIS — F32.A ANXIETY AND DEPRESSION: Primary | ICD-10-CM

## 2024-04-15 DIAGNOSIS — E55.9 VITAMIN D DEFICIENCY: ICD-10-CM

## 2024-04-15 DIAGNOSIS — F41.9 ANXIETY AND DEPRESSION: Primary | ICD-10-CM

## 2024-04-15 PROCEDURE — 99441 PR PHYS/QHP TELEPHONE EVALUATION 5-10 MIN: CPT | Performed by: FAMILY MEDICINE

## 2024-04-15 RX ORDER — ERGOCALCIFEROL 1.25 MG/1
50000 CAPSULE ORAL WEEKLY
Qty: 12 CAPSULE | Refills: 1 | Status: SHIPPED | OUTPATIENT
Start: 2024-04-15

## 2024-04-15 RX ORDER — FERROUS SULFATE 325(65) MG
325 TABLET ORAL
Qty: 30 TABLET | Refills: 2 | Status: SHIPPED | OUTPATIENT
Start: 2024-04-15

## 2024-04-15 SDOH — ECONOMIC STABILITY: INCOME INSECURITY: HOW HARD IS IT FOR YOU TO PAY FOR THE VERY BASICS LIKE FOOD, HOUSING, MEDICAL CARE, AND HEATING?: NOT VERY HARD

## 2024-04-15 SDOH — ECONOMIC STABILITY: FOOD INSECURITY: WITHIN THE PAST 12 MONTHS, THE FOOD YOU BOUGHT JUST DIDN'T LAST AND YOU DIDN'T HAVE MONEY TO GET MORE.: NEVER TRUE

## 2024-04-15 SDOH — ECONOMIC STABILITY: HOUSING INSECURITY
IN THE LAST 12 MONTHS, WAS THERE A TIME WHEN YOU DID NOT HAVE A STEADY PLACE TO SLEEP OR SLEPT IN A SHELTER (INCLUDING NOW)?: NO

## 2024-04-15 SDOH — ECONOMIC STABILITY: FOOD INSECURITY: WITHIN THE PAST 12 MONTHS, YOU WORRIED THAT YOUR FOOD WOULD RUN OUT BEFORE YOU GOT MONEY TO BUY MORE.: NEVER TRUE

## 2024-04-15 ASSESSMENT — PATIENT HEALTH QUESTIONNAIRE - PHQ9
1. LITTLE INTEREST OR PLEASURE IN DOING THINGS: NOT AT ALL
SUM OF ALL RESPONSES TO PHQ QUESTIONS 1-9: 3
SUM OF ALL RESPONSES TO PHQ QUESTIONS 1-9: 3
2. FEELING DOWN, DEPRESSED OR HOPELESS: NEARLY EVERY DAY
SUM OF ALL RESPONSES TO PHQ9 QUESTIONS 1 & 2: 3
SUM OF ALL RESPONSES TO PHQ QUESTIONS 1-9: 3
SUM OF ALL RESPONSES TO PHQ QUESTIONS 1-9: 3

## 2024-04-15 NOTE — PROGRESS NOTES
Tc to the pt. She verified her name and .   The appt is because of the strong pain in my body.     The pt feels sadness, and she wants to vomit everyday everytime she breast feeds.     Coordination of Care  1. Have you been to the ER, urgent care clinic since your last visit?  Hospitalized since your last visit? No    2. Have you seen or consulted any other health care providers outside of the [facility] since your last visit?  Include any pap smears or colon screening. No  Does the patient need refills?No    Learning Assessment Complete? no  Depression Screening complete in the past 12 months? yes

## 2024-04-15 NOTE — PROGRESS NOTES
Yenifer Kearns (: 1985) is a 39 y.o. female, Established patient, Virtual Visit for evaluation of the following chief complaint(s):   Results (F/u) and Anxiety (Follow up.)       ASSESSMENT/PLAN:  1. Anxiety and depression  Tolerating Cymbalta, continue with current dose.  2. Vitamin D deficiency  Start Rx  3. Iron deficiency anemia due to chronic blood loss  Start replacement.    Return for follow up F2F in 4 weeks for anxiety and pain..    SUBJECTIVE/OBJECTIVE:  VV for anxiety and labs  Pain:  During pregnancy had pain in lower back that radiated into B medial and anterior thighs. It can even radiate into B feet.  Feeling cold at times.  Improves with Ibuprofen. Denies change in bowel or bladder function.  Also has Rt shoulder and axillary pain.  Anxiety/Depression:  Patient started with Cymbalta without any SE.  Since pregnancy started seeing psychologist because was feeling withdrawn, having difficulty bonding with her child, having panic attacks.  Had talked to Patricia in past and would like to see her again.  Current psychologist does not speak Nigerian.    Shx:  Lives with brother and his wife.  Has 3 children 16, 5, 5mo.     Review of Systems     Patient-Reported Vitals  No data recorded     Physical Exam    On this date 4/15/2024 I have spent 8 minutes reviewing previous notes, test results and face to face (virtual) with the patient discussing the diagnosis and importance of compliance with the treatment plan as well as documenting on the day of the visit.  Yenifer Kearns, was evaluated through a synchronous (real-time) audio-video encounter. The patient (or guardian if applicable) is aware that this is a billable service, which includes applicable co-pays. This Virtual Visit was conducted with patient's (and/or legal guardian's) consent. Patient identification was verified, and a caregiver was present when appropriate.   The patient was located at Home: [address] [place] [address]

## 2024-04-15 NOTE — PROGRESS NOTES
Tc to the pt to review medications ordered today and confirm receipt of the texted coupons. The pt confirmed her name and . The meds were reviewed and the pt confirmed receipt of the coupons. Ping Blum RN

## 2024-10-31 ENCOUNTER — OFFICE VISIT (OUTPATIENT)
Age: 39
End: 2024-10-31

## 2024-10-31 DIAGNOSIS — F33.1 MAJOR DEPRESSIVE DISORDER, RECURRENT, MODERATE (HCC): Primary | ICD-10-CM

## 2024-10-31 PROCEDURE — 90791 PSYCH DIAGNOSTIC EVALUATION: CPT | Performed by: SOCIAL WORKER

## 2024-11-06 NOTE — PROGRESS NOTES
INITIAL ASSESSMENT    Reason for Referral: Depression  Suspected or known special circumstances: None  Any history of active or passive suicidal thoughts, plans or actions? No  Any history of active or passive homicidal thoughts, plans or actions? No  Safety Concerns at this time: None identified.    S: Patient has seen this clinician once before in 2022, virtually due to COVID. After that session she felt that she was doing well and did not need a follow up appointment.     She presents today for depression related to not having sufficient support from family during her recent pregnancy. Patient feels she may have had some post partum depression, as well. The baby was born in October of this year and is thriving. There do not seem to be any attachment issues at this time. She is taking Cymbalta and feels that this is helping. She has some unresolved trauma that we had been starting to look at in 2022 and I gently explored that today. Yenifer acknowledges this, but stated \"Matilda murcia,\" (I'm okay now.) I provided some psychoeducation on the effects of trauma on the body and how it can negatively impact parenting. She understood and stated that she will call if she needs a follow up appointment.     Interventions Used this Session:   Cognitive Challenging X Exploration of Relationship Patterns X Psychoeducation    Cognitive Restructuring X Facilitate Insight  Relaxation Techniques    CBT-I  Grief Processing  Review of Tx Plan/Progress    DBT  Guided Imagery   Reflect Patterns and Defenses    Communication Skills  Symptom Management  Role Play/behavioral Rehearsal   X Explore Behavior  Structured Problem Solving X Provide Opportunity for Emotional Expression   X Explore Feelings/Issues  Instilling Hope  Crisis Intervention    Explore Negative Self Talk X Supportive Reflection     X Exploration of Coping Patterns  Mindfulness Training  Other:       O: Abbreviated Mental Status Exam deferred.  Patient presents as